# Patient Record
Sex: MALE | Race: WHITE | NOT HISPANIC OR LATINO | URBAN - METROPOLITAN AREA
[De-identification: names, ages, dates, MRNs, and addresses within clinical notes are randomized per-mention and may not be internally consistent; named-entity substitution may affect disease eponyms.]

---

## 2017-01-17 ENCOUNTER — OUTPATIENT (OUTPATIENT)
Dept: OUTPATIENT SERVICES | Facility: HOSPITAL | Age: 57
LOS: 1 days | End: 2017-01-17
Payer: COMMERCIAL

## 2017-01-17 ENCOUNTER — APPOINTMENT (OUTPATIENT)
Dept: CT IMAGING | Facility: HOSPITAL | Age: 57
End: 2017-01-17

## 2017-01-17 PROCEDURE — 74178 CT ABD&PLV WO CNTR FLWD CNTR: CPT

## 2017-02-13 ENCOUNTER — OTHER (OUTPATIENT)
Age: 57
End: 2017-02-13

## 2017-02-13 DIAGNOSIS — E66.9 OBESITY, UNSPECIFIED: ICD-10-CM

## 2017-02-14 ENCOUNTER — APPOINTMENT (OUTPATIENT)
Dept: FAMILY MEDICINE | Facility: CLINIC | Age: 57
End: 2017-02-14

## 2017-02-14 VITALS
OXYGEN SATURATION: 96 % | HEIGHT: 71 IN | DIASTOLIC BLOOD PRESSURE: 90 MMHG | SYSTOLIC BLOOD PRESSURE: 150 MMHG | RESPIRATION RATE: 14 BRPM | HEART RATE: 88 BPM | BODY MASS INDEX: 35.34 KG/M2 | WEIGHT: 252.44 LBS

## 2017-03-30 ENCOUNTER — APPOINTMENT (OUTPATIENT)
Dept: FAMILY MEDICINE | Facility: CLINIC | Age: 57
End: 2017-03-30

## 2017-03-30 VITALS
RESPIRATION RATE: 15 BRPM | OXYGEN SATURATION: 95 % | HEIGHT: 71 IN | HEART RATE: 93 BPM | DIASTOLIC BLOOD PRESSURE: 81 MMHG | SYSTOLIC BLOOD PRESSURE: 145 MMHG | WEIGHT: 252 LBS | BODY MASS INDEX: 35.28 KG/M2

## 2017-03-30 VITALS — SYSTOLIC BLOOD PRESSURE: 128 MMHG | DIASTOLIC BLOOD PRESSURE: 82 MMHG

## 2017-03-31 ENCOUNTER — OTHER (OUTPATIENT)
Age: 57
End: 2017-03-31

## 2017-06-30 ENCOUNTER — RX RENEWAL (OUTPATIENT)
Age: 57
End: 2017-06-30

## 2017-07-06 ENCOUNTER — APPOINTMENT (OUTPATIENT)
Dept: FAMILY MEDICINE | Facility: CLINIC | Age: 57
End: 2017-07-06

## 2017-09-26 ENCOUNTER — APPOINTMENT (OUTPATIENT)
Dept: FAMILY MEDICINE | Facility: CLINIC | Age: 57
End: 2017-09-26
Payer: COMMERCIAL

## 2017-09-26 ENCOUNTER — LABORATORY RESULT (OUTPATIENT)
Age: 57
End: 2017-09-26

## 2017-09-26 VITALS
SYSTOLIC BLOOD PRESSURE: 142 MMHG | HEIGHT: 71 IN | DIASTOLIC BLOOD PRESSURE: 80 MMHG | BODY MASS INDEX: 36.68 KG/M2 | WEIGHT: 262 LBS | HEART RATE: 92 BPM | RESPIRATION RATE: 16 BRPM | OXYGEN SATURATION: 95 %

## 2017-09-26 PROCEDURE — 99214 OFFICE O/P EST MOD 30 MIN: CPT

## 2017-11-24 ENCOUNTER — RX RENEWAL (OUTPATIENT)
Age: 57
End: 2017-11-24

## 2018-02-26 ENCOUNTER — APPOINTMENT (OUTPATIENT)
Dept: FAMILY MEDICINE | Facility: CLINIC | Age: 58
End: 2018-02-26

## 2018-02-27 ENCOUNTER — APPOINTMENT (OUTPATIENT)
Dept: FAMILY MEDICINE | Facility: CLINIC | Age: 58
End: 2018-02-27
Payer: COMMERCIAL

## 2018-02-27 VITALS
OXYGEN SATURATION: 95 % | HEART RATE: 91 BPM | HEIGHT: 71 IN | SYSTOLIC BLOOD PRESSURE: 140 MMHG | RESPIRATION RATE: 16 BRPM | DIASTOLIC BLOOD PRESSURE: 78 MMHG | BODY MASS INDEX: 36.43 KG/M2 | WEIGHT: 260.19 LBS

## 2018-02-27 DIAGNOSIS — R94.31 ABNORMAL ELECTROCARDIOGRAM [ECG] [EKG]: ICD-10-CM

## 2018-02-27 PROCEDURE — 99214 OFFICE O/P EST MOD 30 MIN: CPT

## 2018-03-01 ENCOUNTER — OTHER (OUTPATIENT)
Age: 58
End: 2018-03-01

## 2018-03-01 PROBLEM — R94.31 ABNORMAL EKG: Status: ACTIVE | Noted: 2018-03-01

## 2018-06-04 ENCOUNTER — APPOINTMENT (OUTPATIENT)
Dept: FAMILY MEDICINE | Facility: CLINIC | Age: 58
End: 2018-06-04
Payer: COMMERCIAL

## 2018-06-04 VITALS
OXYGEN SATURATION: 96 % | BODY MASS INDEX: 34.88 KG/M2 | SYSTOLIC BLOOD PRESSURE: 138 MMHG | WEIGHT: 249.13 LBS | DIASTOLIC BLOOD PRESSURE: 66 MMHG | HEART RATE: 82 BPM | HEIGHT: 71 IN | RESPIRATION RATE: 14 BRPM

## 2018-06-04 VITALS — SYSTOLIC BLOOD PRESSURE: 140 MMHG | DIASTOLIC BLOOD PRESSURE: 84 MMHG

## 2018-06-04 PROCEDURE — 99214 OFFICE O/P EST MOD 30 MIN: CPT

## 2018-06-05 NOTE — PHYSICAL EXAM
[No Acute Distress] : no acute distress [Well Nourished] : well nourished [Well Developed] : well developed [Well-Appearing] : well-appearing [Normal Sclera/Conjunctiva] : normal sclera/conjunctiva [PERRL] : pupils equal round and reactive to light [No JVD] : no jugular venous distention [Supple] : supple [No Lymphadenopathy] : no lymphadenopathy [Thyroid Normal, No Nodules] : the thyroid was normal and there were no nodules present [No Respiratory Distress] : no respiratory distress  [Clear to Auscultation] : lungs were clear to auscultation bilaterally [No Accessory Muscle Use] : no accessory muscle use [Normal Rate] : normal rate  [Regular Rhythm] : with a regular rhythm [Normal S1, S2] : normal S1 and S2 [No Murmur] : no murmur heard [Pedal Pulses Present] : the pedal pulses are present [No Edema] : there was no peripheral edema [Soft] : abdomen soft [Non Tender] : non-tender [Non-distended] : non-distended [No Masses] : no abdominal mass palpated [No HSM] : no HSM [Normal Bowel Sounds] : normal bowel sounds [Normal Posterior Cervical Nodes] : no posterior cervical lymphadenopathy [Normal Anterior Cervical Nodes] : no anterior cervical lymphadenopathy [No CVA Tenderness] : no CVA  tenderness [No Spinal Tenderness] : no spinal tenderness [No Joint Swelling] : no joint swelling [Grossly Normal Strength/Tone] : grossly normal strength/tone [No Rash] : no rash [Normal Gait] : normal gait [Coordination Grossly Intact] : coordination grossly intact [No Focal Deficits] : no focal deficits [Deep Tendon Reflexes (DTR)] : deep tendon reflexes were 2+ and symmetric [Normal Affect] : the affect was normal [Normal Insight/Judgement] : insight and judgment were intact

## 2018-06-05 NOTE — ASSESSMENT
[FreeTextEntry1] : diet, exercise\par discussed colonoscoy, patient states he will make appointment with gi this summer, name provided

## 2018-06-05 NOTE — HISTORY OF PRESENT ILLNESS
[FreeTextEntry1] : here for bp check [de-identified] : recently had complete blood work and physical at [facility]\par has copy of ekg with him, patient was advised to follow up with cardio re htn\par does not have copy of labs yet\par feels well with no complaints

## 2018-11-28 ENCOUNTER — APPOINTMENT (OUTPATIENT)
Dept: FAMILY MEDICINE | Facility: CLINIC | Age: 58
End: 2018-11-28
Payer: COMMERCIAL

## 2018-11-28 VITALS
BODY MASS INDEX: 35.87 KG/M2 | HEIGHT: 71 IN | OXYGEN SATURATION: 96 % | SYSTOLIC BLOOD PRESSURE: 114 MMHG | RESPIRATION RATE: 14 BRPM | DIASTOLIC BLOOD PRESSURE: 68 MMHG | WEIGHT: 256.19 LBS | HEART RATE: 89 BPM

## 2018-11-28 PROCEDURE — 99213 OFFICE O/P EST LOW 20 MIN: CPT

## 2018-11-28 NOTE — ASSESSMENT
[FreeTextEntry1] : Patient doing well his medications will be refilled. He is benign advised to followup with his PMD in several months or as needed

## 2018-11-28 NOTE — PHYSICAL EXAM
[No Acute Distress] : no acute distress [Well Nourished] : well nourished [Well Developed] : well developed [Well-Appearing] : well-appearing [Normal Sclera/Conjunctiva] : normal sclera/conjunctiva [PERRL] : pupils equal round and reactive to light [Normal Outer Ear/Nose] : the outer ears and nose were normal in appearance [Normal Oropharynx] : the oropharynx was normal [No JVD] : no jugular venous distention [Supple] : supple [No Lymphadenopathy] : no lymphadenopathy [Clear to Auscultation] : lungs were clear to auscultation bilaterally [No Accessory Muscle Use] : no accessory muscle use [Normal Rate] : normal rate  [Regular Rhythm] : with a regular rhythm [Normal S1, S2] : normal S1 and S2 [No Murmur] : no murmur heard [No Edema] : there was no peripheral edema [Soft] : abdomen soft [No Masses] : no abdominal mass palpated [No HSM] : no HSM [Normal Bowel Sounds] : normal bowel sounds [Normal Supraclavicular Nodes] : no supraclavicular lymphadenopathy [Normal Anterior Cervical Nodes] : no anterior cervical lymphadenopathy [No CVA Tenderness] : no CVA  tenderness [No Spinal Tenderness] : no spinal tenderness [No Joint Swelling] : no joint swelling [Normal Gait] : normal gait [Coordination Grossly Intact] : coordination grossly intact [No Focal Deficits] : no focal deficits [de-identified] : Mild obesity

## 2018-11-28 NOTE — HISTORY OF PRESENT ILLNESS
[FreeTextEntry1] : hypertension [de-identified] : The patient is here for followup on his hypertension his prescriptions are getting low on he feels well review of systems is unremarkable he is up-to-date with flu shot.

## 2019-01-24 ENCOUNTER — APPOINTMENT (OUTPATIENT)
Dept: GASTROENTEROLOGY | Facility: CLINIC | Age: 59
End: 2019-01-24
Payer: COMMERCIAL

## 2019-01-24 VITALS
DIASTOLIC BLOOD PRESSURE: 96 MMHG | HEIGHT: 71 IN | TEMPERATURE: 98.1 F | WEIGHT: 250 LBS | SYSTOLIC BLOOD PRESSURE: 158 MMHG | BODY MASS INDEX: 35 KG/M2 | OXYGEN SATURATION: 98 % | RESPIRATION RATE: 16 BRPM | HEART RATE: 87 BPM

## 2019-01-24 DIAGNOSIS — Z12.11 ENCOUNTER FOR SCREENING FOR MALIGNANT NEOPLASM OF COLON: ICD-10-CM

## 2019-01-24 PROCEDURE — 99204 OFFICE O/P NEW MOD 45 MIN: CPT

## 2019-01-24 NOTE — HISTORY OF PRESENT ILLNESS
[de-identified] : 58-year-old male presents for evaluation prior to screening colonoscopy. Regular bowel habit without any bleeding. No family history of colon cancer. No prior history of colonoscopy. Denies any reflux complaints, no difficulty swallowing.\par \par Denies any history of coronary artery disease, congestive heart failure or dysrhythmia\par \par Social history: Nonsmoker,  [place]

## 2019-01-24 NOTE — ASSESSMENT
[FreeTextEntry1] : 58-year-old male average risk for colon cancer and polyps\par \par Plan\par Patient educated regarding the indications risks benefits and alternatives to colonoscopy as a tool for colon cancer screening and prevention. He is agreeable to examination.\par \par Patient referred by Dr. Roxanne Martin

## 2019-01-24 NOTE — PHYSICAL EXAM
[General Appearance - Alert] : alert [General Appearance - In No Acute Distress] : in no acute distress [Sclera] : the sclera and conjunctiva were normal [PERRL With Normal Accommodation] : pupils were equal in size, round, and reactive to light [Extraocular Movements] : extraocular movements were intact [Outer Ear] : the ears and nose were normal in appearance [Oropharynx] : the oropharynx was normal [Neck Appearance] : the appearance of the neck was normal [Neck Cervical Mass (___cm)] : no neck mass was observed [Jugular Venous Distention Increased] : there was no jugular-venous distention [Thyroid Diffuse Enlargement] : the thyroid was not enlarged [Thyroid Nodule] : there were no palpable thyroid nodules [Auscultation Breath Sounds / Voice Sounds] : lungs were clear to auscultation bilaterally [Heart Rate And Rhythm] : heart rate was normal and rhythm regular [Heart Sounds] : normal S1 and S2 [Heart Sounds Gallop] : no gallops [Murmurs] : no murmurs [Heart Sounds Pericardial Friction Rub] : no pericardial rub [Edema] : there was no peripheral edema [Bowel Sounds] : normal bowel sounds [Abdomen Soft] : soft [Abdomen Tenderness] : non-tender [Abdomen Mass (___ Cm)] : no abdominal mass palpated [Cervical Lymph Nodes Enlarged Posterior Bilaterally] : posterior cervical [Cervical Lymph Nodes Enlarged Anterior Bilaterally] : anterior cervical [Supraclavicular Lymph Nodes Enlarged Bilaterally] : supraclavicular [Axillary Lymph Nodes Enlarged Bilaterally] : axillary [Femoral Lymph Nodes Enlarged Bilaterally] : femoral [Inguinal Lymph Nodes Enlarged Bilaterally] : inguinal [No CVA Tenderness] : no ~M costovertebral angle tenderness [No Spinal Tenderness] : no spinal tenderness [Abnormal Walk] : normal gait [Nail Clubbing] : no clubbing  or cyanosis of the fingernails [Musculoskeletal - Swelling] : no joint swelling seen [Motor Tone] : muscle strength and tone were normal [Skin Color & Pigmentation] : normal skin color and pigmentation [Skin Turgor] : normal skin turgor [] : no rash [Oriented To Time, Place, And Person] : oriented to person, place, and time [Impaired Insight] : insight and judgment were intact [Affect] : the affect was normal [FreeTextEntry1] : Small umbilical hernia

## 2019-05-08 ENCOUNTER — APPOINTMENT (OUTPATIENT)
Dept: GASTROENTEROLOGY | Facility: AMBULATORY MEDICAL SERVICES | Age: 59
End: 2019-05-08
Payer: COMMERCIAL

## 2019-05-08 PROCEDURE — 45385 COLONOSCOPY W/LESION REMOVAL: CPT

## 2019-05-28 ENCOUNTER — APPOINTMENT (OUTPATIENT)
Dept: FAMILY MEDICINE | Facility: CLINIC | Age: 59
End: 2019-05-28
Payer: COMMERCIAL

## 2019-05-28 VITALS
SYSTOLIC BLOOD PRESSURE: 144 MMHG | BODY MASS INDEX: 34.45 KG/M2 | HEART RATE: 70 BPM | DIASTOLIC BLOOD PRESSURE: 88 MMHG | HEIGHT: 71 IN | OXYGEN SATURATION: 97 % | WEIGHT: 246.06 LBS | RESPIRATION RATE: 14 BRPM

## 2019-05-28 PROCEDURE — 99214 OFFICE O/P EST MOD 30 MIN: CPT

## 2019-05-28 RX ORDER — SODIUM SULFATE, POTASSIUM SULFATE, MAGNESIUM SULFATE 17.5; 3.13; 1.6 G/ML; G/ML; G/ML
17.5-3.13-1.6 SOLUTION, CONCENTRATE ORAL
Qty: 1 | Refills: 0 | Status: COMPLETED | COMMUNITY
Start: 2019-01-24 | End: 2019-05-28

## 2019-05-28 NOTE — PHYSICAL EXAM
[No Acute Distress] : no acute distress [Well Nourished] : well nourished [Well Developed] : well developed [Normal Sclera/Conjunctiva] : normal sclera/conjunctiva [PERRL] : pupils equal round and reactive to light [EOMI] : extraocular movements intact [Normal Outer Ear/Nose] : the outer ears and nose were normal in appearance [Normal Oropharynx] : the oropharynx was normal [No Respiratory Distress] : no respiratory distress  [Clear to Auscultation] : lungs were clear to auscultation bilaterally [Normal Rate] : normal rate  [No Edema] : there was no peripheral edema

## 2019-05-28 NOTE — HISTORY OF PRESENT ILLNESS
[FreeTextEntry1] : here for bp check [de-identified] : feels fine overall\par recently had cpe at [facility] and has blood work, ekg, pft with him \par states she checks bp at home with cuff 130's over 80's\par today repeat bp is 142/88\par

## 2019-08-20 ENCOUNTER — APPOINTMENT (OUTPATIENT)
Dept: FAMILY MEDICINE | Facility: CLINIC | Age: 59
End: 2019-08-20
Payer: COMMERCIAL

## 2019-08-20 VITALS
HEART RATE: 75 BPM | HEIGHT: 71 IN | OXYGEN SATURATION: 96 % | DIASTOLIC BLOOD PRESSURE: 72 MMHG | WEIGHT: 231.25 LBS | SYSTOLIC BLOOD PRESSURE: 110 MMHG | RESPIRATION RATE: 14 BRPM | BODY MASS INDEX: 32.37 KG/M2

## 2019-08-20 PROCEDURE — 99214 OFFICE O/P EST MOD 30 MIN: CPT

## 2019-08-20 NOTE — HISTORY OF PRESENT ILLNESS
[FreeTextEntry1] : here for follow up [de-identified] : overall feels well with no complaints bp has been well controlled\par , has cpe and blood work yearly\par blood work from may 2019 reviewed all wnl

## 2019-09-27 ENCOUNTER — EMERGENCY (EMERGENCY)
Facility: HOSPITAL | Age: 59
LOS: 1 days | Discharge: HOME | End: 2019-09-27
Attending: EMERGENCY MEDICINE | Admitting: HOSPITALIST
Payer: COMMERCIAL

## 2019-09-27 VITALS
HEART RATE: 102 BPM | OXYGEN SATURATION: 97 % | DIASTOLIC BLOOD PRESSURE: 90 MMHG | HEIGHT: 71 IN | SYSTOLIC BLOOD PRESSURE: 151 MMHG | TEMPERATURE: 98 F | WEIGHT: 229.94 LBS | RESPIRATION RATE: 18 BRPM

## 2019-09-27 LAB
ANION GAP SERPL CALC-SCNC: 8 MMOL/L — SIGNIFICANT CHANGE UP (ref 5–17)
BASOPHILS # BLD AUTO: 0.07 K/UL — SIGNIFICANT CHANGE UP (ref 0–0.2)
BASOPHILS NFR BLD AUTO: 0.6 % — SIGNIFICANT CHANGE UP (ref 0–2)
BUN SERPL-MCNC: 14 MG/DL — SIGNIFICANT CHANGE UP (ref 7–23)
CALCIUM SERPL-MCNC: 9.6 MG/DL — SIGNIFICANT CHANGE UP (ref 8.4–10.5)
CHLORIDE SERPL-SCNC: 100 MMOL/L — SIGNIFICANT CHANGE UP (ref 96–108)
CO2 SERPL-SCNC: 29 MMOL/L — SIGNIFICANT CHANGE UP (ref 22–31)
CREAT SERPL-MCNC: 1.09 MG/DL — SIGNIFICANT CHANGE UP (ref 0.5–1.3)
EOSINOPHIL # BLD AUTO: 0.05 K/UL — SIGNIFICANT CHANGE UP (ref 0–0.5)
EOSINOPHIL NFR BLD AUTO: 0.5 % — SIGNIFICANT CHANGE UP (ref 0–6)
GLUCOSE SERPL-MCNC: 110 MG/DL — HIGH (ref 70–99)
HCT VFR BLD CALC: 45.1 % — SIGNIFICANT CHANGE UP (ref 39–50)
HGB BLD-MCNC: 16 G/DL — SIGNIFICANT CHANGE UP (ref 13–17)
IMM GRANULOCYTES NFR BLD AUTO: 0.4 % — SIGNIFICANT CHANGE UP (ref 0–1.5)
LYMPHOCYTES # BLD AUTO: 1.56 K/UL — SIGNIFICANT CHANGE UP (ref 1–3.3)
LYMPHOCYTES # BLD AUTO: 14.5 % — SIGNIFICANT CHANGE UP (ref 13–44)
MAGNESIUM SERPL-MCNC: 1.5 MG/DL — LOW (ref 1.6–2.6)
MCHC RBC-ENTMCNC: 33.2 PG — SIGNIFICANT CHANGE UP (ref 27–34)
MCHC RBC-ENTMCNC: 35.5 GM/DL — SIGNIFICANT CHANGE UP (ref 32–36)
MCV RBC AUTO: 93.6 FL — SIGNIFICANT CHANGE UP (ref 80–100)
MONOCYTES # BLD AUTO: 1.04 K/UL — HIGH (ref 0–0.9)
MONOCYTES NFR BLD AUTO: 9.6 % — SIGNIFICANT CHANGE UP (ref 2–14)
NEUTROPHILS # BLD AUTO: 8.03 K/UL — HIGH (ref 1.8–7.4)
NEUTROPHILS NFR BLD AUTO: 74.4 % — SIGNIFICANT CHANGE UP (ref 43–77)
NRBC # BLD: 0 /100 WBCS — SIGNIFICANT CHANGE UP (ref 0–0)
PLATELET # BLD AUTO: 169 K/UL — SIGNIFICANT CHANGE UP (ref 150–400)
POTASSIUM SERPL-MCNC: 3.8 MMOL/L — SIGNIFICANT CHANGE UP (ref 3.5–5.3)
POTASSIUM SERPL-SCNC: 3.8 MMOL/L — SIGNIFICANT CHANGE UP (ref 3.5–5.3)
RBC # BLD: 4.82 M/UL — SIGNIFICANT CHANGE UP (ref 4.2–5.8)
RBC # FLD: 12.2 % — SIGNIFICANT CHANGE UP (ref 10.3–14.5)
SODIUM SERPL-SCNC: 137 MMOL/L — SIGNIFICANT CHANGE UP (ref 135–145)
TROPONIN I SERPL-MCNC: <.017 NG/ML — LOW (ref 0.02–0.06)
WBC # BLD: 10.79 K/UL — HIGH (ref 3.8–10.5)
WBC # FLD AUTO: 10.79 K/UL — HIGH (ref 3.8–10.5)

## 2019-09-27 PROCEDURE — 99285 EMERGENCY DEPT VISIT HI MDM: CPT

## 2019-09-27 PROCEDURE — 99220: CPT

## 2019-09-27 PROCEDURE — 71045 X-RAY EXAM CHEST 1 VIEW: CPT | Mod: 26

## 2019-09-27 PROCEDURE — 74174 CTA ABD&PLVS W/CONTRAST: CPT | Mod: 26

## 2019-09-27 PROCEDURE — 71275 CT ANGIOGRAPHY CHEST: CPT | Mod: 26

## 2019-09-27 PROCEDURE — 93010 ELECTROCARDIOGRAM REPORT: CPT

## 2019-09-27 RX ORDER — HYDROCHLOROTHIAZIDE 25 MG
25 TABLET ORAL DAILY
Refills: 0 | Status: DISCONTINUED | OUTPATIENT
Start: 2019-09-27 | End: 2019-10-04

## 2019-09-27 RX ORDER — ASPIRIN/CALCIUM CARB/MAGNESIUM 324 MG
81 TABLET ORAL DAILY
Refills: 0 | Status: DISCONTINUED | OUTPATIENT
Start: 2019-09-27 | End: 2019-10-04

## 2019-09-27 RX ORDER — CANDESARTAN CILEXETIL 8 MG/1
1 TABLET ORAL
Qty: 0 | Refills: 0 | DISCHARGE

## 2019-09-27 RX ORDER — ASPIRIN/CALCIUM CARB/MAGNESIUM 324 MG
81 TABLET ORAL ONCE
Refills: 0 | Status: COMPLETED | OUTPATIENT
Start: 2019-09-27 | End: 2019-09-27

## 2019-09-27 RX ORDER — PANTOPRAZOLE SODIUM 20 MG/1
40 TABLET, DELAYED RELEASE ORAL
Refills: 0 | Status: DISCONTINUED | OUTPATIENT
Start: 2019-09-27 | End: 2019-10-04

## 2019-09-27 RX ORDER — NITROGLYCERIN 6.5 MG
1 CAPSULE, EXTENDED RELEASE ORAL EVERY 6 HOURS
Refills: 0 | Status: DISCONTINUED | OUTPATIENT
Start: 2019-09-27 | End: 2019-10-04

## 2019-09-27 RX ORDER — PANTOPRAZOLE SODIUM 20 MG/1
40 TABLET, DELAYED RELEASE ORAL ONCE
Refills: 0 | Status: COMPLETED | OUTPATIENT
Start: 2019-09-27 | End: 2019-09-27

## 2019-09-27 RX ORDER — LOSARTAN POTASSIUM 100 MG/1
50 TABLET, FILM COATED ORAL DAILY
Refills: 0 | Status: DISCONTINUED | OUTPATIENT
Start: 2019-09-27 | End: 2019-10-04

## 2019-09-27 RX ORDER — BENZOCAINE AND MENTHOL 5; 1 G/100ML; G/100ML
1 LIQUID ORAL
Refills: 0 | Status: DISCONTINUED | OUTPATIENT
Start: 2019-09-27 | End: 2019-10-04

## 2019-09-27 RX ORDER — MAGNESIUM SULFATE 500 MG/ML
1 VIAL (ML) INJECTION ONCE
Refills: 0 | Status: COMPLETED | OUTPATIENT
Start: 2019-09-27 | End: 2019-09-27

## 2019-09-27 RX ADMIN — PANTOPRAZOLE SODIUM 40 MILLIGRAM(S): 20 TABLET, DELAYED RELEASE ORAL at 19:29

## 2019-09-27 RX ADMIN — Medication 100 GRAM(S): at 19:29

## 2019-09-27 RX ADMIN — Medication 81 MILLIGRAM(S): at 15:25

## 2019-09-27 NOTE — H&P ADULT - ASSESSMENT
58 yo M with pmhx of HTN presents for CP that started 2am the day of presentation.       # atypical chest pain   - r/o ACS  - ASA 81mg   - Nitro ointment prn  - follow up cardiology  - trend troponin  - 2d ECHO  - lipid panel, hba1c    # subjective fever, sore throat/ cough - likely viral illness  - supportive care  - lozenges prn    # HTN  - c/w HCTZ and losartan    # incidental finding of possible mild diverticulitis  - pt is asymptomatic, NO abd pain, n/v/d  - monitor     # DVT ppx: 58 yo M with pmhx of HTN presents for CP that started 2am the day of presentation.       # atypical chest pain   - r/o ACS r/o GERD  - ASA 81mg   - trial of protonix 40mg qD  - Nitro ointment prn  - follow up cardiology  - trend troponin  - 2d ECHO  - lipid panel, hba1c    # subjective fever, sore throat/ cough - likely viral illness  - supportive care  - lozenges prn    # HTN  - c/w HCTZ and losartan    # incidental finding of possible mild diverticulitis  - pt is asymptomatic, NO abd pain, n/v/d  - monitor     # DVT ppx: ambulate    IMPROVE VTE Individual Risk Assessment    RISK                                                                Points    [  ] Previous VTE                                                  3    [  ] Thrombophilia                                               2    [  ] Lower limb paralysis                                      2        (unable to hold up >15 seconds)      [  ] Current Cancer                                              2         (within 6 months)    [  ] Immobilization > 24 hrs                                1    [  ] ICU/CCU stay > 24 hours                              1    [  ] Age > 60                                                      1    IMPROVE VTE Score _____0____    IMPROVE Score 0-1: Low Risk, No VTE prophylaxis required for most patients, encourage ambulation.   IMPROVE Score 2-3: At risk, pharmacologic VTE prophylaxis is indicated for most patients (in the absence of a contraindication)  IMPROVE Score > or = 4: High Risk, pharmacologic VTE prophylaxis is indicated for most patients (in the absence of a contraindication) 58 yo M with pmhx of HTN presents for CP that started 2am the day of presentation.       # atypical chest pain   - r/o ACS r/o GERD  - ASA 81mg   - trial of protonix 40mg qD  - Nitro ointment prn  - follow up cardiology  - trend troponin  - 2d ECHO  - lipid panel, hba1c    # subjective fever, sore throat/ cough - likely viral illness  - supportive care  - lozenges prn    # HTN  - c/w HCTZ and losartan    # incidental finding of possible mild diverticulitis  - pt is asymptomatic, NO abd pain, n/v/d  - monitor     # Hypomagnesium  - repleted    # DVT ppx: ambulate    IMPROVE VTE Individual Risk Assessment    RISK                                                                Points    [  ] Previous VTE                                                  3    [  ] Thrombophilia                                               2    [  ] Lower limb paralysis                                      2        (unable to hold up >15 seconds)      [  ] Current Cancer                                              2         (within 6 months)    [  ] Immobilization > 24 hrs                                1    [  ] ICU/CCU stay > 24 hours                              1    [  ] Age > 60                                                      1    IMPROVE VTE Score _____0____    IMPROVE Score 0-1: Low Risk, No VTE prophylaxis required for most patients, encourage ambulation.   IMPROVE Score 2-3: At risk, pharmacologic VTE prophylaxis is indicated for most patients (in the absence of a contraindication)  IMPROVE Score > or = 4: High Risk, pharmacologic VTE prophylaxis is indicated for most patients (in the absence of a contraindication) 60 yo M with pmhx of HTN presents for CP that started 2am the day of presentation.       # atypical chest pain   - r/o ACS r/o GERD  - Admit to CDU  - ASA 81mg   - trial of protonix 40mg qD  - Nitro ointment prn  - follow up cardiology  - trend troponin  - 2d ECHO  - lipid panel, hba1c    # subjective fever, sore throat/ cough - likely viral illness  - supportive care  - lozenges prn    # HTN  - c/w HCTZ and losartan    # incidental finding of possible mild diverticulitis  - pt is asymptomatic, NO abd pain, n/v/d  - monitor     # Hypomagnesium  - repleted    # DVT ppx: ambulate    IMPROVE VTE Individual Risk Assessment    RISK                                                                Points    [  ] Previous VTE                                                  3    [  ] Thrombophilia                                               2    [  ] Lower limb paralysis                                      2        (unable to hold up >15 seconds)      [  ] Current Cancer                                              2         (within 6 months)    [  ] Immobilization > 24 hrs                                1    [  ] ICU/CCU stay > 24 hours                              1    [  ] Age > 60                                                      1    IMPROVE VTE Score _____0____    IMPROVE Score 0-1: Low Risk, No VTE prophylaxis required for most patients, encourage ambulation.   IMPROVE Score 2-3: At risk, pharmacologic VTE prophylaxis is indicated for most patients (in the absence of a contraindication)  IMPROVE Score > or = 4: High Risk, pharmacologic VTE prophylaxis is indicated for most patients (in the absence of a contraindication)

## 2019-09-27 NOTE — ED ADULT NURSE NOTE - CHPI ED NUR SYMPTOMS NEG
no syncope/no diaphoresis/no congestion/no shortness of breath/no vomiting/no back pain/no dizziness/no chills/no fever/no nausea

## 2019-09-27 NOTE — ED ADULT NURSE NOTE - OBJECTIVE STATEMENT
pt AO x3, ambulatory, c/o chest pain (pressure) radiating to his neck area since early this morning around 2 AM. PMH of HTN. Denies FMH of heart. Jayson dizziness, SOB or n/v at this time. pt AO x3, ambulatory, c/o chest pain (pressure) radiating to his neck area since early this morning around 2 AM. PMH of HTN. Denies FMH of heart. Jayson dizziness, SOB or n/v at this time. EKG done/sinus and reviewed by MD Colletta. Awaiting further study.

## 2019-09-27 NOTE — H&P ADULT - HISTORY OF PRESENT ILLNESS
58 yo M with pmhx of HTN presents for CP that started 2am the day of presentation. Pt reported substernal CP around 2 am that woke him up, pain radiates to the throat, described as pressure like pain, constant, intensity varies between 4 to 7/10. Pain is worse with bending down and coughing and gets better in a reclining position/sitting up. Pt reports subjective fever and sore throat with slight cough for one day. Pt denies SOB, palpitations, abd pain ,n/v/d, dysuria, or leg swelling.   Pt also reports he had a negative exercise nuclear stress test two years ago. 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    In ED: afebrile,  /90 saturating 97% on RA.  CTA chest: negative  CXR; negative 58 yo M with pmhx of HTN presents for CP that started 2am the day of presentation. Pt reported substernal CP around 2 am that woke him up, pain radiates to the throat, described as pressure like pain, constant, intensity varies between 4 to 7/10. Pain is worse with bending down and coughing and gets better in a reclining position/sitting up. Pt also states that he had three slices of pizza right before sleeping and chest pain felt like acid reflux at times.  Pt reports subjective fever and sore throat with slight cough for one day. Pt denies SOB, palpitations, abd pain ,n/v/d, dysuria, or leg swelling.   Pt also reports he had a negative exercise nuclear stress test two years ago. 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    In ED: afebrile,  /90 saturating 97% on RA.  CTA chest: negative  CXR; negative  EKG: negative ST/T changes

## 2019-09-27 NOTE — H&P ADULT - NSHPPHYSICALEXAM_GEN_ALL_CORE
GENERAL: NAD  HEAD:  Atraumatic, Normocephalic  EYES: EOMI, PERRLA, sclera clear  Throat: could not visualize throat completely but + erythematous upper pharynx   NECK: Supple  CHEST/LUNG: Clear to auscultation bilaterally; No wheeze  HEART: Regular rate and rhythm; No murmurs  ABDOMEN: Soft, Nontender, Nondistended; Bowel sounds present  EXTREMITIES:  no edema  PSYCH: AAOx3  NEUROLOGY: non-focal  SKIN: No rashes or lesions GENERAL: NAD  HEAD:  Atraumatic, Normocephalic  EYES: EOMI, PERRLA, sclera clear  Throat: could not visualize throat completely but + erythematous upper pharynx   NECK: Supple  CHEST/LUNG: Clear to auscultation bilaterally; No wheeze  HEART: Regular rate and rhythm; No murmurs. no reproducible CP  ABDOMEN: Soft, Nontender, Nondistended; Bowel sounds present  EXTREMITIES:  no edema  PSYCH: AAOx3  NEUROLOGY: non-focal  SKIN: No rashes or lesions

## 2019-09-27 NOTE — H&P ADULT - NSHPLABSRESULTS_GEN_ALL_CORE
T(C): 37.8 (09-27-19 @ 18:41), Max: 37.8 (09-27-19 @ 18:41)  T(F): 100 (09-27-19 @ 18:41), Max: 100 (09-27-19 @ 18:41)  HR: 110 (09-27-19 @ 18:41) (100 - 110)  BP: 148/87 (09-27-19 @ 18:41) (148/87 - 154/91)  RR: 18 (09-27-19 @ 18:41) (18 - 18)  SpO2: 96% (09-27-19 @ 18:41) (96% - 97%)  Labs:                                  16.0   10.79<H>   )-----------(   169      ( 27 Sep 2019 15:30 )                        45.1     Neutro%  74.4    Lympho%  14.5    Mono%    9.6     Bands    x        09-27    137  |  100  |  14  ----------------------------<  110<H>  3.8   |  29  |  1.09    Ca    9.6      27 Sep 2019 15:30  Mg     1.5     09-27      eGFR if Non African American: 75 mL/min/1.73M2 (09-27-19 @ 15:30)  eGFR if : 86 mL/min/1.73M2 (09-27-19 @ 15:30)      CARDIAC MARKERS ( 27 Sep 2019 15:30 )  <.017 ng/mL / x     / x     / x     / x

## 2019-09-28 ENCOUNTER — TRANSCRIPTION ENCOUNTER (OUTPATIENT)
Age: 59
End: 2019-09-28

## 2019-09-28 VITALS
RESPIRATION RATE: 16 BRPM | SYSTOLIC BLOOD PRESSURE: 138 MMHG | DIASTOLIC BLOOD PRESSURE: 80 MMHG | OXYGEN SATURATION: 100 % | HEART RATE: 96 BPM | TEMPERATURE: 98 F

## 2019-09-28 LAB
ANION GAP SERPL CALC-SCNC: 11 MMOL/L — SIGNIFICANT CHANGE UP (ref 5–17)
BASOPHILS # BLD AUTO: 0.05 K/UL — SIGNIFICANT CHANGE UP (ref 0–0.2)
BASOPHILS NFR BLD AUTO: 0.5 % — SIGNIFICANT CHANGE UP (ref 0–2)
BUN SERPL-MCNC: 12 MG/DL — SIGNIFICANT CHANGE UP (ref 7–23)
CALCIUM SERPL-MCNC: 9.6 MG/DL — SIGNIFICANT CHANGE UP (ref 8.4–10.5)
CHLORIDE SERPL-SCNC: 100 MMOL/L — SIGNIFICANT CHANGE UP (ref 96–108)
CHOLEST SERPL-MCNC: 140 MG/DL — SIGNIFICANT CHANGE UP (ref 10–199)
CO2 SERPL-SCNC: 26 MMOL/L — SIGNIFICANT CHANGE UP (ref 22–31)
CREAT SERPL-MCNC: 1.08 MG/DL — SIGNIFICANT CHANGE UP (ref 0.5–1.3)
EOSINOPHIL # BLD AUTO: 0.05 K/UL — SIGNIFICANT CHANGE UP (ref 0–0.5)
EOSINOPHIL NFR BLD AUTO: 0.5 % — SIGNIFICANT CHANGE UP (ref 0–6)
GLUCOSE SERPL-MCNC: 123 MG/DL — HIGH (ref 70–99)
HBA1C BLD-MCNC: 5.6 % — SIGNIFICANT CHANGE UP (ref 4–5.6)
HCT VFR BLD CALC: 42.9 % — SIGNIFICANT CHANGE UP (ref 39–50)
HCV AB S/CO SERPL IA: 0.06 S/CO — SIGNIFICANT CHANGE UP (ref 0–0.99)
HCV AB SERPL-IMP: SIGNIFICANT CHANGE UP
HDLC SERPL-MCNC: 50 MG/DL — SIGNIFICANT CHANGE UP
HGB BLD-MCNC: 15.1 G/DL — SIGNIFICANT CHANGE UP (ref 13–17)
IMM GRANULOCYTES NFR BLD AUTO: 0.3 % — SIGNIFICANT CHANGE UP (ref 0–1.5)
LIPID PNL WITH DIRECT LDL SERPL: 74 MG/DL — SIGNIFICANT CHANGE UP
LYMPHOCYTES # BLD AUTO: 1.36 K/UL — SIGNIFICANT CHANGE UP (ref 1–3.3)
LYMPHOCYTES # BLD AUTO: 14.8 % — SIGNIFICANT CHANGE UP (ref 13–44)
MAGNESIUM SERPL-MCNC: 2.1 MG/DL — SIGNIFICANT CHANGE UP (ref 1.6–2.6)
MCHC RBC-ENTMCNC: 32.1 PG — SIGNIFICANT CHANGE UP (ref 27–34)
MCHC RBC-ENTMCNC: 35.2 GM/DL — SIGNIFICANT CHANGE UP (ref 32–36)
MCV RBC AUTO: 91.1 FL — SIGNIFICANT CHANGE UP (ref 80–100)
MONOCYTES # BLD AUTO: 0.95 K/UL — HIGH (ref 0–0.9)
MONOCYTES NFR BLD AUTO: 10.3 % — SIGNIFICANT CHANGE UP (ref 2–14)
NEUTROPHILS # BLD AUTO: 6.76 K/UL — SIGNIFICANT CHANGE UP (ref 1.8–7.4)
NEUTROPHILS NFR BLD AUTO: 73.6 % — SIGNIFICANT CHANGE UP (ref 43–77)
NRBC # BLD: 0 /100 WBCS — SIGNIFICANT CHANGE UP (ref 0–0)
PLATELET # BLD AUTO: 182 K/UL — SIGNIFICANT CHANGE UP (ref 150–400)
POTASSIUM SERPL-MCNC: 3.3 MMOL/L — LOW (ref 3.5–5.3)
POTASSIUM SERPL-SCNC: 3.3 MMOL/L — LOW (ref 3.5–5.3)
RBC # BLD: 4.71 M/UL — SIGNIFICANT CHANGE UP (ref 4.2–5.8)
RBC # FLD: 12.4 % — SIGNIFICANT CHANGE UP (ref 10.3–14.5)
SODIUM SERPL-SCNC: 137 MMOL/L — SIGNIFICANT CHANGE UP (ref 135–145)
TOTAL CHOLESTEROL/HDL RATIO MEASUREMENT: 2.8 RATIO — LOW (ref 3.4–9.6)
TRIGL SERPL-MCNC: 80 MG/DL — SIGNIFICANT CHANGE UP (ref 10–149)
WBC # BLD: 9.2 K/UL — SIGNIFICANT CHANGE UP (ref 3.8–10.5)
WBC # FLD AUTO: 9.2 K/UL — SIGNIFICANT CHANGE UP (ref 3.8–10.5)

## 2019-09-28 PROCEDURE — 86803 HEPATITIS C AB TEST: CPT

## 2019-09-28 PROCEDURE — 80061 LIPID PANEL: CPT

## 2019-09-28 PROCEDURE — 99284 EMERGENCY DEPT VISIT MOD MDM: CPT | Mod: 25

## 2019-09-28 PROCEDURE — 93306 TTE W/DOPPLER COMPLETE: CPT

## 2019-09-28 PROCEDURE — 85027 COMPLETE CBC AUTOMATED: CPT

## 2019-09-28 PROCEDURE — 96374 THER/PROPH/DIAG INJ IV PUSH: CPT | Mod: XU

## 2019-09-28 PROCEDURE — G0378: CPT

## 2019-09-28 PROCEDURE — 84484 ASSAY OF TROPONIN QUANT: CPT

## 2019-09-28 PROCEDURE — 99217: CPT

## 2019-09-28 PROCEDURE — 36415 COLL VENOUS BLD VENIPUNCTURE: CPT

## 2019-09-28 PROCEDURE — 80048 BASIC METABOLIC PNL TOTAL CA: CPT

## 2019-09-28 PROCEDURE — 83036 HEMOGLOBIN GLYCOSYLATED A1C: CPT

## 2019-09-28 PROCEDURE — 99220: CPT

## 2019-09-28 PROCEDURE — 71275 CT ANGIOGRAPHY CHEST: CPT

## 2019-09-28 PROCEDURE — 74174 CTA ABD&PLVS W/CONTRAST: CPT

## 2019-09-28 PROCEDURE — 93005 ELECTROCARDIOGRAM TRACING: CPT

## 2019-09-28 PROCEDURE — 71045 X-RAY EXAM CHEST 1 VIEW: CPT

## 2019-09-28 PROCEDURE — 83735 ASSAY OF MAGNESIUM: CPT

## 2019-09-28 RX ORDER — PANTOPRAZOLE SODIUM 20 MG/1
1 TABLET, DELAYED RELEASE ORAL
Qty: 15 | Refills: 0
Start: 2019-09-28 | End: 2019-10-12

## 2019-09-28 RX ORDER — POTASSIUM CHLORIDE 20 MEQ
40 PACKET (EA) ORAL EVERY 4 HOURS
Refills: 0 | Status: COMPLETED | OUTPATIENT
Start: 2019-09-28 | End: 2019-09-28

## 2019-09-28 RX ADMIN — BENZOCAINE AND MENTHOL 1 LOZENGE: 5; 1 LIQUID ORAL at 11:01

## 2019-09-28 RX ADMIN — Medication 81 MILLIGRAM(S): at 11:00

## 2019-09-28 RX ADMIN — PANTOPRAZOLE SODIUM 40 MILLIGRAM(S): 20 TABLET, DELAYED RELEASE ORAL at 05:26

## 2019-09-28 RX ADMIN — Medication 40 MILLIEQUIVALENT(S): at 10:32

## 2019-09-28 RX ADMIN — Medication 40 MILLIEQUIVALENT(S): at 13:36

## 2019-09-28 RX ADMIN — Medication 25 MILLIGRAM(S): at 05:26

## 2019-09-28 RX ADMIN — LOSARTAN POTASSIUM 50 MILLIGRAM(S): 100 TABLET, FILM COATED ORAL at 05:26

## 2019-09-28 NOTE — PROGRESS NOTE ADULT - ASSESSMENT
58 yo M with PMHx of HTN presents with chest pain, now resolved    #Atypical chest pain: likely acid reflux,  trops negative x 3, CTA chest/ abdomen negative for dissection. ECHO without regional wall abnormalities. Normal lipid panel,   - continue protonix  - evaluated by [facility] (Dr. Dye) ok to have stress test outpt  - medically stable for dc, will follow up with [facility] after discharge    #HTN:- continue home losartan and HCTZ    #Hypokalemia: repleted    #DVT ppx:   - ambulate    CAPRINI SCORE [CLOT]    AGE RELATED RISK FACTORS                                                       MOBILITY RELATED FACTORS  [x ] Age 41-60 years                                            (1 Point)                  [ ] Bed rest                                                        (1 Point)  [ ] Age: 61-74 years                                           (2 Points)                 [ ] Plaster cast                                                   (2 Points)  [ ] Age= 75 years                                              (3 Points)                 [ ] Bed bound for more than 72 hours                 (2 Points)    DISEASE RELATED RISK FACTORS                                               GENDER SPECIFIC FACTORS  [ ] Edema in the lower extremities                       (1 Point)                  [ ] Pregnancy                                                     (1 Point)  [ ] Varicose veins                                               (1 Point)                  [ ] Post-partum < 6 weeks                                   (1 Point)             [ ] BMI > 25 Kg/m2                                            (1 Point)                  [ ] Hormonal therapy  or oral contraception          (1 Point)                 [ ] Sepsis (in the previous month)                        (1 Point)                  [ ] History of pregnancy complications                 (1 point)  [ ] Pneumonia or serious lung disease                                               [ ] Unexplained or recurrent                     (1 Point)           (in the previous month)                               (1 Point)  [ ] Abnormal pulmonary function test                     (1 Point)                 SURGERY RELATED RISK FACTORS  [ ] Acute myocardial infarction                              (1 Point)                 [ ]  Section                                             (1 Point)  [ ] Congestive heart failure (in the previous month)  (1 Point)               [ ] Minor surgery                                                  (1 Point)   [ ] Inflammatory bowel disease                             (1 Point)                 [ ] Arthroscopic surgery                                        (2 Points)  [ ] Central venous access                                      (2 Points)                [ ] General surgery lasting more than 45 minutes   (2 Points)       [ ] Stroke (in the previous month)                          (5 Points)               [ ] Elective arthroplasty                                         (5 Points)                                                                                                                                               HEMATOLOGY RELATED FACTORS                                                 TRAUMA RELATED RISK FACTORS  [ ] Prior episodes of VTE                                     (3 Points)                 [ ] Fracture of the hip, pelvis, or leg                       (5 Points)  [ ] Positive family history for VTE                         (3 Points)                 [ ] Acute spinal cord injury (in the previous month)  (5 Points)  [ ] Prothrombin 27546 A                                     (3 Points)                 [ ] Paralysis  (less than 1 month)                             (5 Points)  [ ] Factor V Leiden                                             (3 Points)                  [ ] Multiple Trauma within 1 month                        (5 Points)  [ ] Lupus anticoagulants                                     (3 Points)                                                           [ ] Anticardiolipin antibodies                               (3 Points)                                                       [ ] High homocysteine in the blood                      (3 Points)                                             [ ] Other congenital or acquired thrombophilia      (3 Points)                                                [ ] Heparin induced thrombocytopenia                  (3 Points)                                          Total Score [    1      ]

## 2019-09-28 NOTE — DISCHARGE NOTE PROVIDER - CARE PROVIDER_API CALL
Paulo Dye)  [facility]  [address], [address]  [place] [address]  Phone: [phone]  Fax: [phone]  Follow Up Time:

## 2019-09-28 NOTE — DISCHARGE NOTE PROVIDER - HOSPITAL COURSE
60 yo M with HTN presents with chest pain. Trops were negative x 3, ECHO normal EF, mildly dilated aorta, CTA chest/abdomen negative for aortic dissection. Chest pain resolved, likely acid reflux, will trial with PPI. Pt was evaluated by Cardiology, medically stable for discharge to follow up with Cardiology outpt for stress test.

## 2019-09-28 NOTE — DISCHARGE NOTE PROVIDER - NSDCCPCAREPLAN_GEN_ALL_CORE_FT
PRINCIPAL DISCHARGE DIAGNOSIS  Diagnosis: Chest pain, unspecified type  Assessment and Plan of Treatment:       SECONDARY DISCHARGE DIAGNOSES  Diagnosis: HTN (hypertension)  Assessment and Plan of Treatment:

## 2019-09-28 NOTE — PROGRESS NOTE ADULT - SUBJECTIVE AND OBJECTIVE BOX
Patient is a 59y old  Male who presents with a chief complaint of CP (27 Sep 2019 18:11)      Interval HPI/ Overnight events: No overnight events    Patient seen and examined at bedside, states that chest pain has resolved, has discomfort near his throat    REVIEW OF SYSTEMS:    CONSTITUTIONAL: No weakness, fevers or chills  EYES/ENT: No visual changes;  No vertigo   NECK: No pain or stiffness  RESPIRATORY: No cough, wheezing, hemoptysis; No shortness of breath  CARDIOVASCULAR: No chest pain or palpitations  GASTROINTESTINAL: No abdominal or epigastric pain. No nausea, vomiting, or hematemesis; No diarrhea or constipation. No melena or hematochezia.  GENITOURINARY: No dysuria, frequency or hematuria  NEUROLOGICAL: No numbness or weakness  SKIN: No itching, burning, rashes, or lesions   MSK: no joint pain, no joint swelling  All other review of systems is negative unless indicated above.      ALLERGIES:  No Known Allergies    MEDICATIONS  (STANDING):  aspirin  chewable 81 milliGRAM(s) Oral daily  hydrochlorothiazide 25 milliGRAM(s) Oral daily  losartan 50 milliGRAM(s) Oral daily  pantoprazole    Tablet 40 milliGRAM(s) Oral before breakfast  potassium chloride    Tablet ER 40 milliEquivalent(s) Oral every 4 hours    MEDICATIONS  (PRN):  benzocaine 15 mG/menthol 3.6 mG Lozenge 1 Lozenge Oral every 2 hours PRN Sore Throat  nitroglycerin    2% Ointment 1 Inch(s) Transdermal every 6 hours PRN chest pain    Vital Signs Last 24 Hrs  T(F): 98.2 (28 Sep 2019 10:50), Max: 100 (27 Sep 2019 18:41)  HR: 96 (28 Sep 2019 10:50) (96 - 111)  BP: 138/80 (28 Sep 2019 10:50) (123/74 - 154/91)  RR: 16 (28 Sep 2019 10:50) (15 - 18)  SpO2: 100% (28 Sep 2019 10:50) (95% - 100%)  I&O's Summary    27 Sep 2019 07:01  -  28 Sep 2019 07:00  --------------------------------------------------------  IN: 0 mL / OUT: 1 mL / NET: -1 mL        PHYSICAL EXAM:  General: NAD, well dressed, well nourished  Head: NT/AC  ENT: MMM, no oropharyngeal erythema or exudates  Neck: Supple, No JVD  Lungs: Clear to auscultation bilaterally, no wheezing, rales, or rhonchi, no accessory muscle use  Cardio: RRR, normal S1/S2, No M/R/G  Abdomen: Normoactive BS, Abdomen soft, nontender, nondistended; no organomegaly  Extremities: No calf tenderness, no clubbing or  cyanosis  Vascular: no LE edema  Skin: warm and dry  Neuro: AAOx3, answers all questions appropriately, moves all extremities    LABS:                        15.1   9.20  )-----------( 182      ( 28 Sep 2019 06:30 )             42.9     09-28    137  |  100  |  12  ----------------------------<  123  3.3   |  26  |  1.08    Ca    9.6      28 Sep 2019 06:30  Mg     2.1     09-28      eGFR if Non African American: 76 mL/min/1.73M2 (09-28-19 @ 06:30)  eGFR if : 88 mL/min/1.73M2 (09-28-19 @ 06:30)        CARDIAC MARKERS ( 27 Sep 2019 21:00 )  <.017 ng/mL / x     / x     / x     / x      CARDIAC MARKERS ( 27 Sep 2019 18:05 )  <.017 ng/mL / x     / x     / x     / x      CARDIAC MARKERS ( 27 Sep 2019 15:30 )  <.017 ng/mL / x     / x     / x     / x          09-28 Chol 140 mg/dL LDL 74 mg/dL HDL 50 mg/dL Trig 80 mg/dL      RADIOLOGY & ADDITIONAL TESTS:    < from: CT Angio Abdomen and Pelvis w/ IV Cont (09.27.19 @ 17:19) >  FINDINGS:    CHEST:     LUNGS AND LARGE AIRWAYS: Patent central airways. No pulmonary nodules.   Atelectatic changes.  PLEURA: No pleural effusion.  VESSELS: Within normal limits. The aorta is normal in appearance. There   is no evidence for dissection.,  HEART: Heart size is normal. No pericardial effusion.  MEDIASTINUM AND MAYLIN: No lymphadenopathy.  CHEST WALL AND LOWER NECK: Within normal limits.    ABDOMEN AND PELVIS:    LIVER: The liver is low in attenuation comparison the spleen suggesting   fatty infiltration.  BILE DUCTS: Normal caliber.  GALLBLADDER: Within normal limits.  SPLEEN: Within normal limits.  PANCREAS: Within normal limits.  ADRENALS: Within normal limits.  KIDNEYS/URETERS: Within normal limits.    BLADDER: Within normal limits.  REPRODUCTIVE ORGANS: Prostate gland is mildly prominent in size. Please   correlate clinically.    BOWEL: Colonic diverticuli are present predominantly in the descending   and sigmoid colon. There is mild soft tissue stranding adjacent to a   thick walled diverticuli in the proximal sigmoid colon raising concern   for mildacute diverticulitis. No discrete collection or extraluminal air   is identified. Appendix normal in appearance.  PERITONEUM: No ascites.  VESSELS: There is mild ectasia of the common iliac arteries bilaterally   measuring up to 1.8 cm on the right and 1.7 cm on the left.  RETROPERITONEUM/LYMPH NODES: No lymphadenopathy.    ABDOMINAL WALL: Within normal limits.  BONES: Mild degenerative changes of bone. Nonspecific 5 mm sclerotic   density in the right iliac bone. This may represent a bone island. There   is minimal scoliosis.    IMPRESSION:     Mild diverticulitis in the proximal sigmoid colon.    No evidence for aortic dissection. Hepatic steatosis. Additional findings   as above.    < end of copied text >        Care Discussed with Consultants/Other Providers:

## 2019-09-28 NOTE — CONSULT NOTE ADULT - ASSESSMENT
Assessment and Plan:    Assessment:  · Assessment		  60 yo M with pmhx of HTN presents for CP that started 2am the day of presentation.     # atypical chest pain , CTA neg  - r/o GERD  -stress test  - ASA 81mg   - trial of protonix 40mg qD  - Nitro ointment prn  - follow up cardiology  - lipid panel, hba1c    # subjective fever, sore throat/ cough - likely viral illness  - supportive care  - lozenges prn    # HTN  - c/w HCTZ and losartan    # incidental finding of possible mild diverticulitis  - pt is asymptomatic, NO abd pain, n/v/d  - monitor     # Hypomagnesium  - repleted    # DVT ppx: ambulate    IMPROVE VTE Individual Risk Assessment    RISK                                                                Points    [  ] Previous VTE                                                  3    [  ] Thrombophilia                                               2    [  ] Lower limb paralysis                                      2        (unable to hold up >15 seconds)      [  ] Current Cancer                                              2         (within 6 months)    [  ] Immobilization > 24 hrs                                1    [  ] ICU/CCU stay > 24 hours                              1    [  ] Age > 60                                                      1    IMPROVE VTE Score _____0____    IMPROVE Score 0-1: Low Risk, No VTE prophylaxis required for most patients, encourage ambulation.   IMPROVE Score 2-3: At risk, pharmacologic VTE prophylaxis is indicated for most patients (in the absence of a contraindication)  IMPROVE Score > or = 4: High Risk, pharmacologic VTE prophylaxis is indicated for most patients (in the absence of a contraindication)

## 2019-09-28 NOTE — DISCHARGE NOTE NURSING/CASE MANAGEMENT/SOCIAL WORK - PATIENT PORTAL LINK FT
You can access the FollowMyHealth Patient Portal offered by [facility] by registering at the following website: [email or web address]. By joining [facility]’s FollowMyHealth portal, you will also be able to view your health information using other applications (apps) compatible with our system.

## 2019-09-28 NOTE — CONSULT NOTE ADULT - SUBJECTIVE AND OBJECTIVE BOX
Chief Complaint: chest pain    HPI:  History of Present Illness:  Reason for Admission: CP	  History of Present Illness: 	  60 yo M with pmhx of HTN presents for CP that started 2am the day of presentation. Pt reported substernal CP around 2 am that woke him up, pain radiates to the throat, described as pressure like pain, constant, intensity varies between 4 to 7/10. Pain is worse with bending down and coughing and gets better in a reclining position/sitting up. Pt also states that he had three slices of pizza right before sleeping and chest pain felt like acid reflux at times.  Pt reports subjective fever and sore throat with slight cough for one day. Pt denies SOB, palpitations, abd pain ,n/v/d, dysuria, or leg swelling.   Pt also reports he had a negative exercise nuclear stress test two years ago. 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        PMH:   Essential hypertension    PSH:   No significant past surgical history    Family History:  FAMILY HISTORY:  FH: stroke: grandfather      Social History:  Smoking:  Alcohol:  Drugs:    Allergies:  No Known Allergies      Medications:  aspirin  chewable 81 milliGRAM(s) Oral daily  benzocaine 15 mG/menthol 3.6 mG Lozenge 1 Lozenge Oral every 2 hours PRN  hydrochlorothiazide 25 milliGRAM(s) Oral daily  losartan 50 milliGRAM(s) Oral daily  nitroglycerin    2% Ointment 1 Inch(s) Transdermal every 6 hours PRN  pantoprazole    Tablet 40 milliGRAM(s) Oral before breakfast      REVIEW OF SYSTEMS:  CONSTITUTIONAL: No fever, weight loss, or fatigue  EYES: No eye pain, visual disturbances, or discharge  ENMT:  No difficulty hearing, tinnitus, vertigo; No sinus or throat pain  NECK: No pain or stiffness  BREASTS: No pain, masses, or nipple discharge  RESPIRATORY: No cough, wheezing, chills or hemoptysis; No shortness of breath  CARDIOVASCULAR: No chest pain, palpitations, dizziness, or leg swelling  GASTROINTESTINAL: No abdominal or epigastric pain. No nausea, vomiting, or hematemesis; No diarrhea or constipation. No melena or hematochezia.  GENITOURINARY: No dysuria, frequency, hematuria, or incontinence  NEUROLOGICAL: No headaches, memory loss, loss of strength, numbness, or tremors  SKIN: No itching, burning, rashes, or lesions   LYMPH NODES: No enlarged glands  ENDOCRINE: No heat or cold intolerance; No hair loss  MUSCULOSKELETAL: No joint pain or swelling; No muscle, back, or extremity pain  PSYCHIATRIC: No depression, anxiety, mood swings, or difficulty sleeping  HEME/LYMPH: No easy bruising, or bleeding gums  ALLERY AND IMMUNOLOGIC: No hives or eczema    Physical Exam:  T(C): 36.8 (19 @ 10:50), Max: 37.5 (19 @ 06:13)  HR: 96 (19 @ 10:50) (96 - 111)  BP: 138/80 (19 @ 10:50) (123/74 - 144/85)  RR: 16 (19 @ 10:50) (15 - 16)  SpO2: 100% (19 @ 10:50) (95% - 100%)  Wt(kg): --    GENERAL: NAD, well-groomed, well-developed  HEAD:  Atraumatic, Normocephalic  EYES: EOMI, conjunctiva and sclera clear  ENT: Moist mucous membranes,  NECK: Supple, No JVD, no bruits  CHEST/LUNG: Clear to percussion bilaterally; No rales, rhonchi, wheezing, or rubs  HEART: Regular rate and rhythm; No murmurs, rubs, or gallops PMI non displaced.  ABDOMEN: Soft, Nontender, Nondistended; Bowel sounds present  EXTREMITIES:  2+ Peripheral Pulses, No clubbing, cyanosis, or edema  SKIN: No rashes or lesions  NERVOUS SYSTEM:  Alert & Oriented X3, Good concentration; Motor Strength 5/5 B/L upper and lower extremities; DTRs 2+ intact and symmetric    Cardiovascular Diagnostic Testing:  ECG: < from: 12 Lead ECG (19 @ 15:07) >  Diagnosis Line Sinus tachycardiawith premature supraventricular complexes  Nonspecific ST abnormality  Abnormal ECG  No previous ECGs available  Confirmed by DAVID MOODY, TRUDY VINCENT () on 2019 10:21:36 AM    < end of copied text >  echo  < from: TTE Echo Complete w/Doppler (19 @ 08:20) >  Summary:   1. Left ventricular ejection fraction, by visual estimation, is 60 to   65%.   2. Spectral Doppler shows impaired relaxation pattern of left   ventricular myocardial filling (Grade I diastolic dysfunction).   3. Mild to moderate aortic regurgitation.   4. Dilatation of the aortic root.   5. Pulmonary hypertension is absent.   6. LA volume Index is 17.6 ml/m² ml/m2.    [id number] Trudy Dye MD, [facility] , Electronically signed on 2019 at   1:32:20 PM              < end of copied text >      Labs:                        15.1   9.20  )-----------( 182      ( 28 Sep 2019 06:30 )             42.9         137  |  100  |  12  ----------------------------<  123<H>  3.3<L>   |  26  |  1.08    Ca    9.6      28 Sep 2019 06:30  Mg     2.1             CARDIAC MARKERS ( 27 Sep 2019 21:00 )  <.017 ng/mL / x     / x     / x     / x      CARDIAC MARKERS ( 27 Sep 2019 18:05 )  <.017 ng/mL / x     / x     / x     / x      CARDIAC MARKERS ( 27 Sep 2019 15:30 )  <.017 ng/mL / x     / x     / x     / x            Total Cholesterol: 140  LDL: 74  HDL: 50  T    Hemoglobin A1C, Whole Blood: 5.6 % ( @ 06:30)      ASSESMENT AND PLAN:

## 2019-10-04 ENCOUNTER — MESSAGE (OUTPATIENT)
Age: 59
End: 2019-10-04

## 2019-10-04 DIAGNOSIS — R07.89 OTHER CHEST PAIN: ICD-10-CM

## 2019-10-07 PROBLEM — I10 ESSENTIAL (PRIMARY) HYPERTENSION: Chronic | Status: ACTIVE | Noted: 2019-09-27

## 2019-11-13 ENCOUNTER — APPOINTMENT (OUTPATIENT)
Dept: GASTROENTEROLOGY | Facility: AMBULATORY MEDICAL SERVICES | Age: 59
End: 2019-11-13

## 2020-02-20 ENCOUNTER — APPOINTMENT (OUTPATIENT)
Dept: FAMILY MEDICINE | Facility: CLINIC | Age: 60
End: 2020-02-20
Payer: COMMERCIAL

## 2020-02-20 VITALS
HEIGHT: 71 IN | WEIGHT: 228 LBS | HEART RATE: 63 BPM | BODY MASS INDEX: 31.92 KG/M2 | OXYGEN SATURATION: 99 % | SYSTOLIC BLOOD PRESSURE: 124 MMHG | DIASTOLIC BLOOD PRESSURE: 79 MMHG | RESPIRATION RATE: 15 BRPM

## 2020-02-20 DIAGNOSIS — Z87.448 PERSONAL HISTORY OF OTHER DISEASES OF URINARY SYSTEM: ICD-10-CM

## 2020-02-20 PROCEDURE — 99214 OFFICE O/P EST MOD 30 MIN: CPT

## 2020-02-24 NOTE — HISTORY OF PRESENT ILLNESS
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

## 2020-08-18 ENCOUNTER — RX RENEWAL (OUTPATIENT)
Age: 60
End: 2020-08-18

## 2020-09-14 ENCOUNTER — APPOINTMENT (OUTPATIENT)
Dept: FAMILY MEDICINE | Facility: CLINIC | Age: 60
End: 2020-09-14
Payer: COMMERCIAL

## 2020-09-14 VITALS
RESPIRATION RATE: 14 BRPM | WEIGHT: 232.44 LBS | TEMPERATURE: 98.6 F | DIASTOLIC BLOOD PRESSURE: 74 MMHG | SYSTOLIC BLOOD PRESSURE: 122 MMHG | BODY MASS INDEX: 32.54 KG/M2 | HEART RATE: 78 BPM | HEIGHT: 71 IN | OXYGEN SATURATION: 97 %

## 2020-09-14 PROCEDURE — 99214 OFFICE O/P EST MOD 30 MIN: CPT

## 2020-09-14 NOTE — HISTORY OF PRESENT ILLNESS
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

## 2020-10-17 NOTE — ED PROVIDER NOTE - CHPI ED SYMPTOMS NEG
Area of cytotoxic cerebral edema identified when reviewing brain imaging in the territory of the right middle cerebral artery. There is no mass effect associated with it. We will continue to monitor the patients clinical exam for any worsening of symptoms which may indicate expansion of the stroke or the area of the edema resulting in the clinical change. The pattern is suggestive of embolic etiology related to hypercoagulable state.   no back pain/no vomiting/no dizziness/no fever/no nausea

## 2020-11-09 ENCOUNTER — RX RENEWAL (OUTPATIENT)
Age: 60
End: 2020-11-09

## 2020-12-21 PROBLEM — Z12.11 ENCOUNTER FOR SCREENING COLONOSCOPY: Status: RESOLVED | Noted: 2019-01-24 | Resolved: 2020-12-21

## 2021-02-07 ENCOUNTER — RX RENEWAL (OUTPATIENT)
Age: 61
End: 2021-02-07

## 2021-02-16 ENCOUNTER — APPOINTMENT (OUTPATIENT)
Dept: FAMILY MEDICINE | Facility: CLINIC | Age: 61
End: 2021-02-16
Payer: COMMERCIAL

## 2021-02-16 VITALS
TEMPERATURE: 97.9 F | DIASTOLIC BLOOD PRESSURE: 86 MMHG | HEART RATE: 68 BPM | WEIGHT: 237.5 LBS | OXYGEN SATURATION: 98 % | RESPIRATION RATE: 12 BRPM | SYSTOLIC BLOOD PRESSURE: 118 MMHG | BODY MASS INDEX: 33.12 KG/M2

## 2021-02-16 PROCEDURE — 99214 OFFICE O/P EST MOD 30 MIN: CPT

## 2021-02-16 PROCEDURE — 99072 ADDL SUPL MATRL&STAF TM PHE: CPT

## 2021-02-16 RX ORDER — PANTOPRAZOLE 40 MG/1
40 TABLET, DELAYED RELEASE ORAL
Qty: 15 | Refills: 0 | Status: DISCONTINUED | COMMUNITY
Start: 2019-09-28 | End: 2021-02-16

## 2021-02-16 NOTE — PHYSICAL EXAM
[Normal] : normal rate, regular rhythm, normal S1 and S2 and no murmur heard [No Focal Deficits] : no focal deficits

## 2021-02-16 NOTE — HISTORY OF PRESENT ILLNESS
[FreeTextEntry1] : here for bp recheck [de-identified] : patient overall feels fine, stil has not obtained blood work or liver us\par retiring soon from [employer]\par feels well

## 2021-08-11 ENCOUNTER — RX RENEWAL (OUTPATIENT)
Age: 61
End: 2021-08-11

## 2021-11-16 ENCOUNTER — APPOINTMENT (OUTPATIENT)
Dept: FAMILY MEDICINE | Facility: CLINIC | Age: 61
End: 2021-11-16
Payer: COMMERCIAL

## 2021-11-16 VITALS
HEIGHT: 71 IN | DIASTOLIC BLOOD PRESSURE: 90 MMHG | RESPIRATION RATE: 16 BRPM | BODY MASS INDEX: 34.51 KG/M2 | SYSTOLIC BLOOD PRESSURE: 130 MMHG | OXYGEN SATURATION: 95 % | TEMPERATURE: 97.5 F | WEIGHT: 246.5 LBS | HEART RATE: 78 BPM

## 2021-11-16 VITALS — DIASTOLIC BLOOD PRESSURE: 84 MMHG | SYSTOLIC BLOOD PRESSURE: 130 MMHG

## 2021-11-16 PROCEDURE — 99213 OFFICE O/P EST LOW 20 MIN: CPT

## 2021-11-16 NOTE — PLAN
[FreeTextEntry1] : \par Fatty liver: advised patient to go for us has not been able to schedule. Advised patient to call with any questions or concerns. Patient verbalized understanding. \par \par HTN: discussed with patient elevated at office today, get bp cuff at home to keep log of readings at home next two weeks and call office to let us know what pressure is at home. discussed if continuously elevated readings will need to increase bp meds, educated risks of elevated bp put him at risk for stroke, heart disease, death etc. Patient verbalized understanding. \par \par Patient to f/u office 3 months or sooner if indicated based on bp readings. Sent 90 day supply of medication needs blood work for further renewals. Advised patient to call with any questions or concerns. Patient verbalized understanding.

## 2021-11-16 NOTE — PHYSICAL EXAM
[Normal Sclera/Conjunctiva] : normal sclera/conjunctiva [Normal Gait] : normal gait [Normal] : affect was normal and insight and judgment were intact

## 2021-11-16 NOTE — HISTORY OF PRESENT ILLNESS
[FreeTextEntry1] : f/u [de-identified] : Patient here today for f/u on htn. Patient reports he has not gone for blood work or us of liver bc of covid. He reports he does not check blood pressure at home because he does not think his machine records accurate pressures. Pt denies any SOB, cough, chest pain, palpitations, N/V/D/C, fever, or chills. No other health concerns today.

## 2022-02-10 ENCOUNTER — APPOINTMENT (OUTPATIENT)
Dept: FAMILY MEDICINE | Facility: CLINIC | Age: 62
End: 2022-02-10
Payer: COMMERCIAL

## 2022-02-10 VITALS
TEMPERATURE: 97.8 F | DIASTOLIC BLOOD PRESSURE: 84 MMHG | WEIGHT: 232 LBS | BODY MASS INDEX: 32.48 KG/M2 | HEART RATE: 78 BPM | OXYGEN SATURATION: 96 % | RESPIRATION RATE: 16 BRPM | HEIGHT: 71 IN | SYSTOLIC BLOOD PRESSURE: 120 MMHG

## 2022-02-10 PROCEDURE — 99213 OFFICE O/P EST LOW 20 MIN: CPT

## 2022-04-05 ENCOUNTER — NON-APPOINTMENT (OUTPATIENT)
Age: 62
End: 2022-04-05

## 2022-04-05 LAB
25(OH)D3 SERPL-MCNC: 51 NG/ML
ALBUMIN SERPL ELPH-MCNC: 4.7 G/DL
ALP BLD-CCNC: 47 U/L
ALT SERPL-CCNC: 20 U/L
ANION GAP SERPL CALC-SCNC: 14 MMOL/L
APPEARANCE: CLEAR
AST SERPL-CCNC: 22 U/L
BACTERIA: NEGATIVE
BASOPHILS # BLD AUTO: 0.05 K/UL
BASOPHILS NFR BLD AUTO: 1 %
BILIRUB SERPL-MCNC: 0.5 MG/DL
BILIRUBIN URINE: NEGATIVE
BLOOD URINE: NEGATIVE
BUN SERPL-MCNC: 14 MG/DL
CALCIUM SERPL-MCNC: 9.4 MG/DL
CHLORIDE SERPL-SCNC: 100 MMOL/L
CHOLEST SERPL-MCNC: 161 MG/DL
CO2 SERPL-SCNC: 23 MMOL/L
COLOR: YELLOW
CREAT SERPL-MCNC: 1.08 MG/DL
EGFR: 79 ML/MIN/1.73M2
EOSINOPHIL # BLD AUTO: 0.11 K/UL
EOSINOPHIL NFR BLD AUTO: 2.1 %
ESTIMATED AVERAGE GLUCOSE: 128 MG/DL
GLUCOSE QUALITATIVE U: NEGATIVE
GLUCOSE SERPL-MCNC: 126 MG/DL
HBA1C MFR BLD HPLC: 6.1 %
HCT VFR BLD CALC: 43.6 %
HDLC SERPL-MCNC: 43 MG/DL
HGB BLD-MCNC: 14.9 G/DL
HYALINE CASTS: 1 /LPF
IMM GRANULOCYTES NFR BLD AUTO: 0.2 %
KETONES URINE: NEGATIVE
LDLC SERPL CALC-MCNC: 101 MG/DL
LEUKOCYTE ESTERASE URINE: NEGATIVE
LYMPHOCYTES # BLD AUTO: 1.51 K/UL
LYMPHOCYTES NFR BLD AUTO: 29 %
MAN DIFF?: NORMAL
MCHC RBC-ENTMCNC: 32 PG
MCHC RBC-ENTMCNC: 34.2 GM/DL
MCV RBC AUTO: 93.8 FL
MICROSCOPIC-UA: NORMAL
MONOCYTES # BLD AUTO: 0.51 K/UL
MONOCYTES NFR BLD AUTO: 9.8 %
NEUTROPHILS # BLD AUTO: 3.02 K/UL
NEUTROPHILS NFR BLD AUTO: 57.9 %
NITRITE URINE: NEGATIVE
NONHDLC SERPL-MCNC: 118 MG/DL
PH URINE: 6.5
PLATELET # BLD AUTO: 173 K/UL
POTASSIUM SERPL-SCNC: 4.1 MMOL/L
PROT SERPL-MCNC: 6.4 G/DL
PROTEIN URINE: NEGATIVE
PSA SERPL-MCNC: 2.88 NG/ML
RBC # BLD: 4.65 M/UL
RBC # FLD: 12.8 %
RED BLOOD CELLS URINE: 2 /HPF
SODIUM SERPL-SCNC: 137 MMOL/L
SPECIFIC GRAVITY URINE: 1.01
SQUAMOUS EPITHELIAL CELLS: 0 /HPF
TRIGL SERPL-MCNC: 86 MG/DL
TSH SERPL-ACNC: 2.04 UIU/ML
UROBILINOGEN URINE: NORMAL
WBC # FLD AUTO: 5.21 K/UL
WHITE BLOOD CELLS URINE: 1 /HPF

## 2022-05-05 ENCOUNTER — OUTPATIENT (OUTPATIENT)
Dept: OUTPATIENT SERVICES | Facility: HOSPITAL | Age: 62
LOS: 1 days | End: 2022-05-05
Payer: COMMERCIAL

## 2022-05-05 ENCOUNTER — NON-APPOINTMENT (OUTPATIENT)
Age: 62
End: 2022-05-05

## 2022-05-05 ENCOUNTER — APPOINTMENT (OUTPATIENT)
Dept: ULTRASOUND IMAGING | Facility: HOSPITAL | Age: 62
End: 2022-05-05
Payer: COMMERCIAL

## 2022-05-05 DIAGNOSIS — K76.0 FATTY (CHANGE OF) LIVER, NOT ELSEWHERE CLASSIFIED: ICD-10-CM

## 2022-05-05 PROCEDURE — 76705 ECHO EXAM OF ABDOMEN: CPT | Mod: 26

## 2022-05-05 PROCEDURE — 76705 ECHO EXAM OF ABDOMEN: CPT

## 2022-05-10 ENCOUNTER — APPOINTMENT (OUTPATIENT)
Dept: FAMILY MEDICINE | Facility: CLINIC | Age: 62
End: 2022-05-10
Payer: COMMERCIAL

## 2022-05-10 VITALS — SYSTOLIC BLOOD PRESSURE: 128 MMHG | DIASTOLIC BLOOD PRESSURE: 80 MMHG

## 2022-05-10 VITALS
DIASTOLIC BLOOD PRESSURE: 80 MMHG | SYSTOLIC BLOOD PRESSURE: 130 MMHG | WEIGHT: 229 LBS | BODY MASS INDEX: 32.06 KG/M2 | TEMPERATURE: 98 F | OXYGEN SATURATION: 99 % | HEART RATE: 74 BPM | RESPIRATION RATE: 18 BRPM | HEIGHT: 71 IN

## 2022-05-10 DIAGNOSIS — R97.20 ELEVATED PROSTATE, SPECIFIC ANTIGEN [PSA]: ICD-10-CM

## 2022-05-10 DIAGNOSIS — K76.0 FATTY (CHANGE OF) LIVER, NOT ELSEWHERE CLASSIFIED: ICD-10-CM

## 2022-05-10 PROCEDURE — 99213 OFFICE O/P EST LOW 20 MIN: CPT

## 2022-06-15 ENCOUNTER — APPOINTMENT (OUTPATIENT)
Dept: CT IMAGING | Facility: HOSPITAL | Age: 62
End: 2022-06-15

## 2022-09-29 ENCOUNTER — APPOINTMENT (OUTPATIENT)
Dept: GASTROENTEROLOGY | Facility: CLINIC | Age: 62
End: 2022-09-29
Payer: COMMERCIAL

## 2022-09-29 ENCOUNTER — NON-APPOINTMENT (OUTPATIENT)
Age: 62
End: 2022-09-29

## 2022-09-29 VITALS
RESPIRATION RATE: 18 BRPM | DIASTOLIC BLOOD PRESSURE: 80 MMHG | SYSTOLIC BLOOD PRESSURE: 125 MMHG | TEMPERATURE: 98 F | OXYGEN SATURATION: 98 % | WEIGHT: 220 LBS | BODY MASS INDEX: 30.8 KG/M2 | HEIGHT: 71 IN | HEART RATE: 78 BPM

## 2022-09-29 DIAGNOSIS — K21.9 GASTRO-ESOPHAGEAL REFLUX DISEASE W/OUT ESOPHAGITIS: ICD-10-CM

## 2022-09-29 DIAGNOSIS — Z86.010 PERSONAL HISTORY OF COLONIC POLYPS: ICD-10-CM

## 2022-09-29 PROCEDURE — 99204 OFFICE O/P NEW MOD 45 MIN: CPT

## 2022-09-29 NOTE — HISTORY OF PRESENT ILLNESS
[FreeTextEntry1] : 62-year-old male\par Recently retired \par Last colonoscopy 3 years ago, adenomas removed, due for surveillance\par No change in bowel habits since last examination\par No new family history of colon cancer\par Patient with intermittent reflux, without dysphagia\par \par Denies history of coronary disease congestive heart failure or dysrhythmia

## 2022-10-11 ENCOUNTER — NON-APPOINTMENT (OUTPATIENT)
Age: 62
End: 2022-10-11

## 2022-10-23 LAB — SARS-COV-2 N GENE NPH QL NAA+PROBE: NOT DETECTED

## 2022-10-26 ENCOUNTER — APPOINTMENT (OUTPATIENT)
Dept: GASTROENTEROLOGY | Facility: AMBULATORY MEDICAL SERVICES | Age: 62
End: 2022-10-26

## 2022-10-26 PROCEDURE — 43239 EGD BIOPSY SINGLE/MULTIPLE: CPT

## 2022-10-26 PROCEDURE — 45385 COLONOSCOPY W/LESION REMOVAL: CPT | Mod: 33

## 2022-11-03 ENCOUNTER — NON-APPOINTMENT (OUTPATIENT)
Age: 62
End: 2022-11-03

## 2023-01-25 ENCOUNTER — APPOINTMENT (OUTPATIENT)
Dept: FAMILY MEDICINE | Facility: CLINIC | Age: 63
End: 2023-01-25
Payer: COMMERCIAL

## 2023-01-25 VITALS
TEMPERATURE: 95.5 F | DIASTOLIC BLOOD PRESSURE: 82 MMHG | OXYGEN SATURATION: 98 % | SYSTOLIC BLOOD PRESSURE: 130 MMHG | WEIGHT: 217 LBS | RESPIRATION RATE: 16 BRPM | HEIGHT: 71 IN | HEART RATE: 74 BPM | BODY MASS INDEX: 30.38 KG/M2

## 2023-01-25 DIAGNOSIS — H91.90 UNSPECIFIED HEARING LOSS, UNSPECIFIED EAR: ICD-10-CM

## 2023-01-25 PROCEDURE — 99214 OFFICE O/P EST MOD 30 MIN: CPT

## 2023-01-25 NOTE — ASSESSMENT
[FreeTextEntry1] : 61 yo M PMH HTN, prediabetes, fatty liver. former smoker (quit 1980s, pack year: 9) presenting today for HTN follow-up.

## 2023-01-25 NOTE — PLAN
[FreeTextEntry1] : #HTN \par -stable \par -continue current regimen of candestartan-HCTZ 32-25 QD, refilled \par -follow-up for CPE \par \par #Hearing loss \par -follow-up with ENT, referral provided \par \par HCM: colonoscopy 11/22 -due 2032

## 2023-01-25 NOTE — HISTORY OF PRESENT ILLNESS
[FreeTextEntry1] : HTN follow-up  [de-identified] : 63 yo M PMH HTN, prediabetes, fatty liver. former smoker (quit 1980s, pack year: 9) presenting today for HTN follow-up. \par He states that his hearing has become progressively worse over the past 2 years. Reports that he has had bad hearing for many years, was evaluated in the past, told at that time he did not need a hearing aid but would likely need it in the future.  \par \par \par social: retired , lives with wife and two kids

## 2023-06-08 ENCOUNTER — NON-APPOINTMENT (OUTPATIENT)
Age: 63
End: 2023-06-08

## 2023-06-09 ENCOUNTER — NON-APPOINTMENT (OUTPATIENT)
Age: 63
End: 2023-06-09

## 2023-06-09 ENCOUNTER — APPOINTMENT (OUTPATIENT)
Dept: OTOLARYNGOLOGY | Facility: CLINIC | Age: 63
End: 2023-06-09
Payer: COMMERCIAL

## 2023-06-09 VITALS
DIASTOLIC BLOOD PRESSURE: 80 MMHG | HEART RATE: 74 BPM | SYSTOLIC BLOOD PRESSURE: 134 MMHG | BODY MASS INDEX: 30.8 KG/M2 | HEIGHT: 71 IN | WEIGHT: 220 LBS

## 2023-06-09 DIAGNOSIS — J31.0 CHRONIC RHINITIS: ICD-10-CM

## 2023-06-09 DIAGNOSIS — H69.81 OTHER SPECIFIED DISORDERS OF EUSTACHIAN TUBE, RIGHT EAR: ICD-10-CM

## 2023-06-09 DIAGNOSIS — Z86.79 PERSONAL HISTORY OF OTHER DISEASES OF THE CIRCULATORY SYSTEM: ICD-10-CM

## 2023-06-09 PROCEDURE — 99204 OFFICE O/P NEW MOD 45 MIN: CPT | Mod: 25

## 2023-06-09 PROCEDURE — 92511 NASOPHARYNGOSCOPY: CPT

## 2023-06-09 PROCEDURE — 92567 TYMPANOMETRY: CPT

## 2023-06-09 PROCEDURE — 92557 COMPREHENSIVE HEARING TEST: CPT

## 2023-06-09 RX ORDER — AZELASTINE HYDROCHLORIDE 137 UG/1
137 SPRAY, METERED NASAL
Qty: 1 | Refills: 3 | Status: ACTIVE | COMMUNITY
Start: 2023-06-09 | End: 1900-01-01

## 2023-06-09 RX ORDER — FLUTICASONE PROPIONATE 50 UG/1
50 SPRAY, METERED NASAL TWICE DAILY
Qty: 1 | Refills: 3 | Status: ACTIVE | COMMUNITY
Start: 2023-06-09 | End: 1900-01-01

## 2023-06-09 NOTE — CONSULT LETTER
[Dear  ___] : Dear  [unfilled], [Consult Letter:] : I had the pleasure of evaluating your patient, [unfilled]. [Please see my note below.] : Please see my note below. [Consult Closing:] : Thank you very much for allowing me to participate in the care of this patient.  If you have any questions, please do not hesitate to contact me. [Sincerely,] : Sincerely, [FreeTextEntry3] : Power Jarvis M.D.

## 2023-06-09 NOTE — ASSESSMENT
[FreeTextEntry1] : Myles Salas presents for evaluation. He has chronic rhinitis and right eustachian tube dysfunction. Otoscopic exam was otherwise normal. Nasopharyngoscopy was performed and was normal. Audiogram was performed and reviewed showing type C tymp AD, type A tymp AS, mild to severe sensorineural hearing loss AU. HE is interested in amplification.\par \par - start fluticasone 2 sprays to each nostril BID x 1 month.\par - start azelastine 2 sprays to each nostril BID x 1 month.\par - hearing aid evaluation.\par - follow up in 1 month.\par \par

## 2023-06-09 NOTE — DATA REVIEWED
[de-identified] : AD: Type C\par AS: Type A\par mild to severe essentially SNL, 250-8000 Hz AU\par good/fair SRS AU\par REC: ENT f/u, re-eval per MD, consider AU hearing aids\par

## 2023-06-09 NOTE — REVIEW OF SYSTEMS
[Ear Pain] : ear pain [Hearing Loss] : hearing loss [Recurrent Ear Infections] : recurrent ear infections [Ear Noises] : ear noises [Nasal Congestion] : nasal congestion [Problem Snoring] : problem snoring [Sinus Pressure] : sinus pressure [Throat Clearing] : throat clearing [Joint Pain] : joint pain [Negative] : Heme/Lymph [FreeTextEntry6] : Noisy breathing

## 2023-06-09 NOTE — HISTORY OF PRESENT ILLNESS
[de-identified] : Myles Salas is a 62 yo male who was referred by Dr. Smalls for evaluation of hearing loss.  He notes gradually diminishing hearing over the past several years. He notes intermittent vibrating sensation inside her ears and he has to pop his ears. This is worse on the right. He notes intermittent tinnitus. He denies vertigo. He denies otalgia, otorrhea, or history of recurrent ear infections as an adult. He did have ear tubes as a child. He denies recent fevers or chills. He denies facial weakness or facial numbness. He denies family history of early onset hearing loss. He notes occupational noise exposure as a . He denies exposure to ototoxic medications or head trauma. He notes intermittent nasal congestion. He denies rhinorrhea or postnasal drainage. He denies sinus pressure. He denies history of recurrent sinusitis. He has not had allergy testing. He does not use nasal sprays. He does use Q-tips.

## 2023-06-09 NOTE — PHYSICAL EXAM
[] : septum deviated to the right [Midline] : trachea located in midline position [Removed] : palatine tonsils previously removed [Normal] : no rashes [de-identified] : Right EAC wnl. Clot and cerumen in left EAC.

## 2023-07-14 ENCOUNTER — APPOINTMENT (OUTPATIENT)
Dept: OTOLARYNGOLOGY | Facility: CLINIC | Age: 63
End: 2023-07-14

## 2023-08-02 ENCOUNTER — APPOINTMENT (OUTPATIENT)
Dept: FAMILY MEDICINE | Facility: CLINIC | Age: 63
End: 2023-08-02
Payer: COMMERCIAL

## 2023-08-02 VITALS
OXYGEN SATURATION: 98 % | HEIGHT: 71 IN | BODY MASS INDEX: 31.5 KG/M2 | SYSTOLIC BLOOD PRESSURE: 140 MMHG | RESPIRATION RATE: 16 BRPM | HEART RATE: 71 BPM | WEIGHT: 225 LBS | DIASTOLIC BLOOD PRESSURE: 83 MMHG | TEMPERATURE: 98.6 F

## 2023-08-02 DIAGNOSIS — Z13.29 ENCOUNTER FOR SCREENING FOR OTHER SUSPECTED ENDOCRINE DISORDER: ICD-10-CM

## 2023-08-02 PROCEDURE — 99213 OFFICE O/P EST LOW 20 MIN: CPT

## 2023-08-02 RX ORDER — CANDESARTAN CILEXETIL AND HYDROCHLOROTHIAZIDE 32; 25 MG/1; MG/1
32-25 TABLET ORAL
Qty: 90 | Refills: 1 | Status: DISCONTINUED | COMMUNITY
Start: 2021-02-16 | End: 2023-08-02

## 2023-08-06 NOTE — PLAN
[FreeTextEntry1] : #HTN  -stable  -will continue with candesartan 32 mg QD, D/C HCTZ and trial amlodipine 5 mg QD instead  -he will keep a BP log at home, call if any side effects, follow-up in 2 weeks or sooner if needed   -we discussed if symptoms do not improve/any other urinary issues f/u with urology

## 2023-08-06 NOTE — REVIEW OF SYSTEMS
[Negative] : Respiratory [Dysuria] : no dysuria [Incontinence] : no incontinence [Hesitancy] : no hesitancy [Hematuria] : no hematuria

## 2023-08-06 NOTE — HISTORY OF PRESENT ILLNESS
[FreeTextEntry1] : HTN follow-up   [de-identified] : 64 yo M PMH HTN, prediabetes, fatty liver. former smoker (quit 1980s, pack year: 9) presenting today for or HTN follow-up. 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   He needs refills on mediation, he is interested in trying another BP medication instead of HCTZ, to see if improvement on side effect of frequent urination.

## 2023-08-06 NOTE — ASSESSMENT
[FreeTextEntry1] : 62 yo M PMH HTN, prediabetes, fatty liver. former smoker (quit 1980s, pack year: 9) presenting today for or HTN follow-up.

## 2023-08-16 ENCOUNTER — APPOINTMENT (OUTPATIENT)
Dept: FAMILY MEDICINE | Facility: CLINIC | Age: 63
End: 2023-08-16
Payer: COMMERCIAL

## 2023-08-16 VITALS
HEIGHT: 71 IN | HEART RATE: 77 BPM | SYSTOLIC BLOOD PRESSURE: 138 MMHG | WEIGHT: 223 LBS | RESPIRATION RATE: 16 BRPM | TEMPERATURE: 98.9 F | DIASTOLIC BLOOD PRESSURE: 80 MMHG | BODY MASS INDEX: 31.22 KG/M2 | OXYGEN SATURATION: 98 %

## 2023-08-16 PROCEDURE — 99213 OFFICE O/P EST LOW 20 MIN: CPT

## 2023-08-16 NOTE — ASSESSMENT
[FreeTextEntry1] : 64 yo M PMH HTN, prediabetes, fatty liver. former smoker (quit 1980s, pack year: 9) presenting today for or HTN follow-up. Bilateral Helical Rim Advancement Flap Text: The defect edges were debeveled with a #15 blade scalpel.  Given the location of the defect and the proximity to free margins (helical rim) a bilateral helical rim advancement flap was deemed most appropriate.  Using a sterile surgical marker, the appropriate advancement flaps were drawn incorporating the defect and placing the expected incisions between the helical rim and antihelix where possible.  The area thus outlined was incised through and through with a #15 scalpel blade.  With a skin hook and iris scissors, the flaps were gently and sharply undermined and freed up.

## 2023-08-16 NOTE — PHYSICAL EXAM
[Normal] : normal rate, regular rhythm, normal S1 and S2 and no murmur heard [de-identified] : no leg swelling Suturegard Retention Suture: 2-0 Nylon

## 2023-08-16 NOTE — PLAN
[FreeTextEntry1] : #HTN  -stable, not optimal  -increase amlodipine to 10 mg QD  -call if any side effects -follow-up in 2 weeks

## 2023-08-16 NOTE — HISTORY OF PRESENT ILLNESS
[FreeTextEntry1] : HTN follow-up   [de-identified] : 64 yo M PMH HTN, prediabetes, fatty liver. former smoker (quit 1980s, pack year: 9) presenting today for or HTN follow-up. On our last visit, we discontinued HCTZ, started amlodipine 5 mg and continued candesartan.  He has kept a BP log at home, on average SBP of 130s, some greater than 135, DBP 80s, a few in the low 90s.   Denies any side effect from amlodipine, has noticed decreased urinary frequency since stopping the HCTZ.

## 2023-08-30 ENCOUNTER — APPOINTMENT (OUTPATIENT)
Dept: FAMILY MEDICINE | Facility: CLINIC | Age: 63
End: 2023-08-30
Payer: COMMERCIAL

## 2023-08-30 VITALS
BODY MASS INDEX: 31.08 KG/M2 | OXYGEN SATURATION: 97 % | RESPIRATION RATE: 16 BRPM | WEIGHT: 222 LBS | SYSTOLIC BLOOD PRESSURE: 120 MMHG | HEART RATE: 75 BPM | DIASTOLIC BLOOD PRESSURE: 76 MMHG | TEMPERATURE: 99.3 F | HEIGHT: 71 IN

## 2023-08-30 PROCEDURE — 99213 OFFICE O/P EST LOW 20 MIN: CPT

## 2023-08-30 NOTE — ASSESSMENT
[FreeTextEntry1] : 64 yo M PMH HTN, prediabetes, fatty liver. former smoker (quit 1980s, pack year: 9) presenting today for or HTN follow-up.

## 2023-08-30 NOTE — HISTORY OF PRESENT ILLNESS
[FreeTextEntry1] : HTN follow-up   [de-identified] : 62 yo M PMH HTN, prediabetes, fatty liver. former smoker (quit 1980s, pack year: 9) presenting today for or HTN follow-up. On our last visit, we increased amlodipine from 5 mg to 10 mg, and continued candesartan.  He has kept a BP log at home, on average SBP 130s/80s, does also have some more elevated numbers.  Denies any side effects from increased dosage of amlodipine.

## 2023-08-30 NOTE — PLAN
[FreeTextEntry1] : #HTN  -elevated on second read (145/90), reports he had a few cups of coffee this morning -continue current regimen of candesartan 32 mg QD and amlodipine 10 mg QD  -he will continue to keep a BP log at home and follow-up if on average > 135/85  - he will call/f/u in office

## 2023-11-15 ENCOUNTER — APPOINTMENT (OUTPATIENT)
Dept: FAMILY MEDICINE | Facility: CLINIC | Age: 63
End: 2023-11-15
Payer: COMMERCIAL

## 2023-11-15 VITALS
TEMPERATURE: 98.6 F | DIASTOLIC BLOOD PRESSURE: 80 MMHG | HEIGHT: 71 IN | OXYGEN SATURATION: 97 % | HEART RATE: 75 BPM | WEIGHT: 213 LBS | SYSTOLIC BLOOD PRESSURE: 130 MMHG | BODY MASS INDEX: 29.82 KG/M2 | RESPIRATION RATE: 14 BRPM

## 2023-11-15 DIAGNOSIS — H90.3 SENSORINEURAL HEARING LOSS, BILATERAL: ICD-10-CM

## 2023-11-15 PROCEDURE — 99214 OFFICE O/P EST MOD 30 MIN: CPT

## 2024-01-26 ENCOUNTER — APPOINTMENT (OUTPATIENT)
Dept: FAMILY MEDICINE | Facility: CLINIC | Age: 64
End: 2024-01-26
Payer: COMMERCIAL

## 2024-01-26 VITALS
HEIGHT: 71 IN | WEIGHT: 217 LBS | BODY MASS INDEX: 30.38 KG/M2 | HEART RATE: 78 BPM | SYSTOLIC BLOOD PRESSURE: 138 MMHG | TEMPERATURE: 97.8 F | OXYGEN SATURATION: 98 % | RESPIRATION RATE: 18 BRPM | DIASTOLIC BLOOD PRESSURE: 82 MMHG

## 2024-01-26 DIAGNOSIS — Z86.69 PERSONAL HISTORY OF OTHER DISEASES OF THE NERVOUS SYSTEM AND SENSE ORGANS: ICD-10-CM

## 2024-01-26 DIAGNOSIS — M25.512 PAIN IN LEFT SHOULDER: ICD-10-CM

## 2024-01-26 DIAGNOSIS — H26.9 UNSPECIFIED CATARACT: ICD-10-CM

## 2024-01-26 PROCEDURE — 99214 OFFICE O/P EST MOD 30 MIN: CPT

## 2024-01-26 RX ORDER — SODIUM PICOSULFATE, MAGNESIUM OXIDE, AND ANHYDROUS CITRIC ACID 10; 3.5; 12 MG/160ML; G/160ML; G/160ML
10-3.5-12 MG-GM LIQUID ORAL
Qty: 1 | Refills: 0 | Status: DISCONTINUED | COMMUNITY
Start: 2022-09-29 | End: 2024-01-26

## 2024-01-26 NOTE — PHYSICAL EXAM
[Normal] : normal rate, regular rhythm, normal S1 and S2 and no murmur heard [No Edema] : there was no peripheral edema [No Extremity Clubbing/Cyanosis] : no extremity clubbing/cyanosis [Normal Gait] : normal gait [Normal Affect] : the affect was normal [Alert and Oriented x3] : oriented to person, place, and time [Normal Insight/Judgement] : insight and judgment were intact

## 2024-01-26 NOTE — HISTORY OF PRESENT ILLNESS
[FreeTextEntry1] : f/u HTN [de-identified] : Pt comes in for BP check. Checks at home, usually 130s/70-80s. Trying to avoid excess salt, admits to having a lot of caffeine (3-4 cups regular or half-caf coffee per day).  Denies chest pain/pressure, headaches, arm/neck/jaw pain. Used to walk a lot, does it less in cold weather.  He was told last year at his yearly ophthalmology visit that he might be developing cataracts.   Also reporting frequent urination. No excessive thirst, no urgency/incontinence, burning, weak stream, nocturia, etc.

## 2024-01-26 NOTE — REVIEW OF SYSTEMS
[Negative] : Cardiovascular [Dysuria] : no dysuria [Incontinence] : no incontinence [Nocturia] : no nocturia [Hematuria] : no hematuria [Frequency] : frequency

## 2024-01-26 NOTE — ASSESSMENT
[FreeTextEntry1] : # HTN Well controlled today Continue current meds - renewed candesartan Continue to avoid excess salt/caffeine in diet. Continue to work on diet/exercise in order to lose weight.  # Early cataracts Referred to ophthalmology - if he does have cataracts wants to see a specialist  # Urinary frequency Possible overactive bladder PSA level decreased in last 2 years. Deferred urology referral for now  f/u for formal CPE

## 2024-02-22 LAB
25(OH)D3 SERPL-MCNC: 28.9 NG/ML
ESTIMATED AVERAGE GLUCOSE: 111 MG/DL
HBA1C MFR BLD HPLC: 5.5 %
TSH SERPL-ACNC: 2.15 UIU/ML

## 2024-05-01 NOTE — PHYSICAL EXAM
[Well Nourished] : well nourished [No Acute Distress] : no acute distress [Well Developed] : well developed [Well-Appearing] : well-appearing [EOMI] : extraocular movements intact [Thyroid Normal, No Nodules] : the thyroid was normal and there were no nodules present [No Respiratory Distress] : no respiratory distress  [No Accessory Muscle Use] : no accessory muscle use [Normal Rate] : normal rate  [Regular Rhythm] : with a regular rhythm [Pedal Pulses Present] : the pedal pulses are present [No Edema] : there was no peripheral edema [No Masses] : no abdominal mass palpated [Normal Posterior Cervical Nodes] : no posterior cervical lymphadenopathy [Normal Anterior Cervical Nodes] : no anterior cervical lymphadenopathy [No CVA Tenderness] : no CVA  tenderness [No Spinal Tenderness] : no spinal tenderness [No Joint Swelling] : no joint swelling [Grossly Normal Strength/Tone] : grossly normal strength/tone [Coordination Grossly Intact] : coordination grossly intact [No Rash] : no rash [Normal Gait] : normal gait [Normal Affect] : the affect was normal [No Focal Deficits] : no focal deficits [Normal Insight/Judgement] : insight and judgment were intact yes

## 2024-05-08 ENCOUNTER — APPOINTMENT (OUTPATIENT)
Dept: FAMILY MEDICINE | Facility: CLINIC | Age: 64
End: 2024-05-08
Payer: COMMERCIAL

## 2024-05-08 VITALS
DIASTOLIC BLOOD PRESSURE: 72 MMHG | OXYGEN SATURATION: 98 % | BODY MASS INDEX: 31.08 KG/M2 | HEART RATE: 81 BPM | HEIGHT: 71 IN | TEMPERATURE: 99.3 F | WEIGHT: 222 LBS | RESPIRATION RATE: 16 BRPM | SYSTOLIC BLOOD PRESSURE: 130 MMHG

## 2024-05-08 DIAGNOSIS — I10 ESSENTIAL (PRIMARY) HYPERTENSION: ICD-10-CM

## 2024-05-08 DIAGNOSIS — Z13.21 ENCOUNTER FOR SCREENING FOR NUTRITIONAL DISORDER: ICD-10-CM

## 2024-05-08 DIAGNOSIS — Z00.00 ENCOUNTER FOR GENERAL ADULT MEDICAL EXAMINATION W/OUT ABNORMAL FINDINGS: ICD-10-CM

## 2024-05-08 DIAGNOSIS — Z12.5 ENCOUNTER FOR SCREENING FOR MALIGNANT NEOPLASM OF PROSTATE: ICD-10-CM

## 2024-05-08 PROCEDURE — 99214 OFFICE O/P EST MOD 30 MIN: CPT

## 2024-05-08 PROCEDURE — G2211 COMPLEX E/M VISIT ADD ON: CPT

## 2024-05-08 RX ORDER — CANDESARTAN CILEXETIL 32 MG/1
32 TABLET ORAL
Qty: 90 | Refills: 1 | Status: ACTIVE | COMMUNITY
Start: 2023-08-02 | End: 1900-01-01

## 2024-05-08 RX ORDER — AMLODIPINE BESYLATE 10 MG/1
10 TABLET ORAL
Qty: 90 | Refills: 1 | Status: ACTIVE | COMMUNITY
Start: 2023-08-02 | End: 1900-01-01

## 2024-05-08 NOTE — PLAN
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

## 2024-05-08 NOTE — HISTORY OF PRESENT ILLNESS
[FreeTextEntry1] : follow-up   [de-identified] : 62 yo M PMH HTN, prediabetes, fatty liver. former smoker (quit 1980s, pack year: 9) presenting today for or HTN follow-up.

## 2024-06-26 ENCOUNTER — APPOINTMENT (OUTPATIENT)
Dept: UROLOGY | Facility: CLINIC | Age: 64
End: 2024-06-26
Payer: COMMERCIAL

## 2024-06-26 VITALS
RESPIRATION RATE: 17 BRPM | SYSTOLIC BLOOD PRESSURE: 139 MMHG | HEIGHT: 71 IN | BODY MASS INDEX: 31.5 KG/M2 | DIASTOLIC BLOOD PRESSURE: 81 MMHG | WEIGHT: 225 LBS | HEART RATE: 82 BPM

## 2024-06-26 DIAGNOSIS — N39.41 URGE INCONTINENCE: ICD-10-CM

## 2024-06-26 DIAGNOSIS — R35.0 FREQUENCY OF MICTURITION: ICD-10-CM

## 2024-06-26 DIAGNOSIS — R39.15 URGENCY OF URINATION: ICD-10-CM

## 2024-06-26 DIAGNOSIS — R33.9 RETENTION OF URINE, UNSPECIFIED: ICD-10-CM

## 2024-06-26 PROCEDURE — 99204 OFFICE O/P NEW MOD 45 MIN: CPT | Mod: 25

## 2024-06-26 PROCEDURE — 51798 US URINE CAPACITY MEASURE: CPT

## 2024-06-26 RX ORDER — ALFUZOSIN HYDROCHLORIDE 10 MG/1
10 TABLET, EXTENDED RELEASE ORAL DAILY
Qty: 30 | Refills: 1 | Status: ACTIVE | COMMUNITY
Start: 2024-06-26 | End: 1900-01-01

## 2024-06-30 LAB
25(OH)D3 SERPL-MCNC: 27.4 NG/ML
ALBUMIN SERPL ELPH-MCNC: 5 G/DL
ALP BLD-CCNC: 60 U/L
ALT SERPL-CCNC: 19 U/L
ANION GAP SERPL CALC-SCNC: 12 MMOL/L
APPEARANCE: CLEAR
AST SERPL-CCNC: 17 U/L
BACTERIA: NEGATIVE /HPF
BASOPHILS # BLD AUTO: 0.06 K/UL
BASOPHILS NFR BLD AUTO: 1.1 %
BILIRUB SERPL-MCNC: 0.5 MG/DL
BILIRUBIN URINE: NEGATIVE
BLOOD URINE: NEGATIVE
BUN SERPL-MCNC: 13 MG/DL
CALCIUM SERPL-MCNC: 10.1 MG/DL
CAST: 0 /LPF
CHLORIDE SERPL-SCNC: 101 MMOL/L
CHOLEST SERPL-MCNC: 181 MG/DL
CO2 SERPL-SCNC: 26 MMOL/L
COLOR: YELLOW
CREAT SERPL-MCNC: 0.92 MG/DL
EGFR: 93 ML/MIN/1.73M2
EOSINOPHIL # BLD AUTO: 0.09 K/UL
EOSINOPHIL NFR BLD AUTO: 1.6 %
EPITHELIAL CELLS: 0 /HPF
GLUCOSE QUALITATIVE U: NEGATIVE MG/DL
GLUCOSE SERPL-MCNC: 120 MG/DL
HCT VFR BLD CALC: 42.8 %
HDLC SERPL-MCNC: 51 MG/DL
HGB BLD-MCNC: 15.3 G/DL
IMM GRANULOCYTES NFR BLD AUTO: 0.4 %
KETONES URINE: NEGATIVE MG/DL
LDLC SERPL CALC-MCNC: 114 MG/DL
LEUKOCYTE ESTERASE URINE: NEGATIVE
LYMPHOCYTES # BLD AUTO: 1.58 K/UL
LYMPHOCYTES NFR BLD AUTO: 27.8 %
MAN DIFF?: NORMAL
MCHC RBC-ENTMCNC: 32.8 PG
MCHC RBC-ENTMCNC: 35.7 GM/DL
MCV RBC AUTO: 91.6 FL
MICROSCOPIC-UA: NORMAL
MONOCYTES # BLD AUTO: 0.43 K/UL
MONOCYTES NFR BLD AUTO: 7.6 %
NEUTROPHILS # BLD AUTO: 3.51 K/UL
NEUTROPHILS NFR BLD AUTO: 61.5 %
NITRITE URINE: NEGATIVE
NONHDLC SERPL-MCNC: 130 MG/DL
PH URINE: 7
PLATELET # BLD AUTO: 180 K/UL
POTASSIUM SERPL-SCNC: 4.3 MMOL/L
PROT SERPL-MCNC: 6.9 G/DL
PROTEIN URINE: NEGATIVE MG/DL
PSA FREE FLD-MCNC: 26 %
PSA FREE SERPL-MCNC: 1 NG/ML
PSA SERPL-MCNC: 3.9 NG/ML
RBC # BLD: 4.67 M/UL
RBC # FLD: 13 %
RED BLOOD CELLS URINE: 0 /HPF
SODIUM SERPL-SCNC: 139 MMOL/L
SPECIFIC GRAVITY URINE: 1.01
TRIGL SERPL-MCNC: 87 MG/DL
UROBILINOGEN URINE: 0.2 MG/DL
WBC # FLD AUTO: 5.69 K/UL
WHITE BLOOD CELLS URINE: 0 /HPF

## 2024-07-10 ENCOUNTER — OUTPATIENT (OUTPATIENT)
Dept: OUTPATIENT SERVICES | Facility: HOSPITAL | Age: 64
LOS: 1 days | End: 2024-07-10
Payer: COMMERCIAL

## 2024-07-10 ENCOUNTER — APPOINTMENT (OUTPATIENT)
Dept: ULTRASOUND IMAGING | Facility: CLINIC | Age: 64
End: 2024-07-10
Payer: COMMERCIAL

## 2024-07-10 DIAGNOSIS — R33.9 RETENTION OF URINE, UNSPECIFIED: ICD-10-CM

## 2024-07-10 PROCEDURE — 76770 US EXAM ABDO BACK WALL COMP: CPT

## 2024-07-10 PROCEDURE — 76770 US EXAM ABDO BACK WALL COMP: CPT | Mod: 26

## 2024-08-01 ENCOUNTER — APPOINTMENT (OUTPATIENT)
Dept: UROLOGY | Facility: CLINIC | Age: 64
End: 2024-08-01
Payer: COMMERCIAL

## 2024-08-01 VITALS
BODY MASS INDEX: 31.5 KG/M2 | WEIGHT: 225 LBS | SYSTOLIC BLOOD PRESSURE: 125 MMHG | RESPIRATION RATE: 17 BRPM | DIASTOLIC BLOOD PRESSURE: 76 MMHG | HEART RATE: 81 BPM | HEIGHT: 71 IN

## 2024-08-01 DIAGNOSIS — R39.12 POOR URINARY STREAM: ICD-10-CM

## 2024-08-01 DIAGNOSIS — R35.0 FREQUENCY OF MICTURITION: ICD-10-CM

## 2024-08-01 DIAGNOSIS — R33.9 RETENTION OF URINE, UNSPECIFIED: ICD-10-CM

## 2024-08-01 DIAGNOSIS — Z80.0 FAMILY HISTORY OF MALIGNANT NEOPLASM OF DIGESTIVE ORGANS: ICD-10-CM

## 2024-08-01 PROCEDURE — 99214 OFFICE O/P EST MOD 30 MIN: CPT | Mod: 25

## 2024-08-01 PROCEDURE — 51798 US URINE CAPACITY MEASURE: CPT

## 2024-08-01 RX ORDER — FINASTERIDE 5 MG/1
5 TABLET, FILM COATED ORAL
Qty: 30 | Refills: 2 | Status: ACTIVE | COMMUNITY
Start: 2024-08-01 | End: 1900-01-01

## 2024-08-01 NOTE — PHYSICAL EXAM
Prostate Size ___ gm] : prostate size [unfilled] gm

## 2024-08-04 PROBLEM — R39.12 WEAK URINARY STREAM: Status: ACTIVE | Noted: 2024-08-04

## 2024-08-04 PROBLEM — R33.9 URINE RETENTION: Status: ACTIVE | Noted: 2024-08-04

## 2024-08-04 NOTE — ADDENDUM
[FreeTextEntry1] : This note was partly authored by Edgar Roland working as a scribe for MARY LOU Doe. I, MARY LOU Doe, have reviewed the content of this note and confirm it is true and accurate. I personally performed the history and physical examination and made all the decisions. 08/01/2024.

## 2024-08-04 NOTE — ASSESSMENT
[FreeTextEntry1] : Last urinated x1.5 hrs ago.  Today  While taking PVR pt denied feeling the need to urinate  Pt expresses strong desire to avoid surgeries if possible, though I explained the risk of upper tract deterioration, hydro, and now retention.  We discussed the use of Alfuzosin to relax the prostate and that we can add another medication, Finasteride, to shrink the prostate.   Patient will start Finasteride 5 MG One Oral tablet daily. I discussed the benefits of Finasteride with the patient such as shrinking benign prostate cells, reducing risk of prostate cancer by 30 percent, hair growth; and potential side of effects such as decreased semen volume, softening of erections, and breast tenderness. I also explained that as this medication shrinks the prostate, it also decreases PSA levels. He expressed understanding.  I explained that he has an enlarged prostate. We discussed the nonspecific nature of the PSA blood test for cancer but that the test is organ specific to the prostate. We discussed that multiple potential reasons can be behind a PSA elevation including prostate cancer, prostate enlargement, or prostate inflammation. Reviewed and discussed PSA levels and explained that from 2016 to 2022 his PSA increased only by 0.6. I informed him that this increase is not remarkable.  When having PSA checked make sure to avoid sex, masturbation, and bike rides as these activities may fluctuate the PSA measurement  PSA in x6 months   Urged that he practice timed voiding as he has urine retention.  Explained that he should drain his bladder with the catheter at least once a day for 3 d and call us with the volumes. We may need to increase frequency and discussed the benefit of "cycling the bladder" but avoiding over distention.  He understood. Supplies were provided and ordered for him.   fu 2 months

## 2024-08-04 NOTE — HISTORY OF PRESENT ILLNESS
[FreeTextEntry1] : 08/01/2024: SHYLA HICKS is a 64 year-old M who presents with follow up for urinary frequency.   At last visit, the pt was started on Alfuzosin.  Before was voiding x9-10's/day. Today reports voiding frequency decreased to about 6-7x's, for which he is happy. Voids more in the morning. As the day goes on it gets better, and he voids less frequently, and "better". Also has less mild loss of urine on the way to restroom. Urine is not soaking through his clothes.  Does not feel any change in strength of his stream, but reports a steady urine flow without pushing or straining.  No FMHx prostate ca.   7/10/24 renal us was fine had a thick bladder wall. which suggests bladder has undergone changes already from "fighting the prostate" and that it may not be elif well to empty.

## 2024-09-26 ENCOUNTER — APPOINTMENT (OUTPATIENT)
Dept: UROLOGY | Facility: CLINIC | Age: 64
End: 2024-09-26

## 2024-11-04 ENCOUNTER — APPOINTMENT (OUTPATIENT)
Dept: FAMILY MEDICINE | Facility: CLINIC | Age: 64
End: 2024-11-04
Payer: COMMERCIAL

## 2024-11-04 VITALS
DIASTOLIC BLOOD PRESSURE: 80 MMHG | TEMPERATURE: 98.3 F | HEIGHT: 71 IN | SYSTOLIC BLOOD PRESSURE: 138 MMHG | HEART RATE: 86 BPM | RESPIRATION RATE: 18 BRPM | BODY MASS INDEX: 33.04 KG/M2 | WEIGHT: 236 LBS | OXYGEN SATURATION: 97 %

## 2024-11-04 DIAGNOSIS — H91.90 UNSPECIFIED HEARING LOSS, UNSPECIFIED EAR: ICD-10-CM

## 2024-11-04 DIAGNOSIS — R73.03 PREDIABETES.: ICD-10-CM

## 2024-11-04 DIAGNOSIS — I10 ESSENTIAL (PRIMARY) HYPERTENSION: ICD-10-CM

## 2024-11-04 DIAGNOSIS — K76.0 FATTY (CHANGE OF) LIVER, NOT ELSEWHERE CLASSIFIED: ICD-10-CM

## 2024-11-04 PROCEDURE — 99214 OFFICE O/P EST MOD 30 MIN: CPT

## 2024-11-25 ENCOUNTER — APPOINTMENT (OUTPATIENT)
Dept: UROLOGY | Facility: CLINIC | Age: 64
End: 2024-11-25

## 2025-03-14 ENCOUNTER — APPOINTMENT (OUTPATIENT)
Dept: UROLOGY | Facility: CLINIC | Age: 65
End: 2025-03-14
Payer: COMMERCIAL

## 2025-03-14 ENCOUNTER — NON-APPOINTMENT (OUTPATIENT)
Age: 65
End: 2025-03-14

## 2025-03-14 VITALS — DIASTOLIC BLOOD PRESSURE: 77 MMHG | HEART RATE: 90 BPM | SYSTOLIC BLOOD PRESSURE: 130 MMHG

## 2025-03-14 DIAGNOSIS — R39.9 UNSPECIFIED SYMPTOMS AND SIGNS INVOLVING THE GENITOURINARY SYSTEM: ICD-10-CM

## 2025-03-14 DIAGNOSIS — N39.41 URGE INCONTINENCE: ICD-10-CM

## 2025-03-14 DIAGNOSIS — R39.12 POOR URINARY STREAM: ICD-10-CM

## 2025-03-14 DIAGNOSIS — R33.9 RETENTION OF URINE, UNSPECIFIED: ICD-10-CM

## 2025-03-14 PROCEDURE — 99214 OFFICE O/P EST MOD 30 MIN: CPT | Mod: 25

## 2025-03-14 PROCEDURE — 51798 US URINE CAPACITY MEASURE: CPT

## 2025-03-15 LAB
BUN SERPL-MCNC: 16 MG/DL
CREAT SERPL-MCNC: 1.1 MG/DL
EGFRCR SERPLBLD CKD-EPI 2021: 75 ML/MIN/1.73M2
ESTIMATED AVERAGE GLUCOSE: 134 MG/DL
HBA1C MFR BLD HPLC: 6.3 %
PSA SERPL-MCNC: 1.48 NG/ML

## 2025-03-21 NOTE — DISCHARGE NOTE PROVIDER - [id number]_CARD_ALL_CORE
----- Message from Pa Bunch MD sent at 3/10/2025  7:53 AM CDT -----  Improved renal function but was still slightly impaired  ----- Message -----  From: Lab, Background User  Sent: 3/7/2025   7:36 PM CDT  To: Pa Bunch MD     No

## 2025-04-21 ENCOUNTER — RX RENEWAL (OUTPATIENT)
Age: 65
End: 2025-04-21

## 2025-07-07 ENCOUNTER — RX RENEWAL (OUTPATIENT)
Age: 65
End: 2025-07-07

## 2025-08-04 ENCOUNTER — APPOINTMENT (OUTPATIENT)
Dept: FAMILY MEDICINE | Facility: CLINIC | Age: 65
End: 2025-08-04
Payer: COMMERCIAL

## 2025-08-04 VITALS
HEIGHT: 71 IN | BODY MASS INDEX: 31.92 KG/M2 | HEART RATE: 89 BPM | TEMPERATURE: 97.6 F | RESPIRATION RATE: 14 BRPM | WEIGHT: 228 LBS | OXYGEN SATURATION: 96 % | SYSTOLIC BLOOD PRESSURE: 120 MMHG | DIASTOLIC BLOOD PRESSURE: 62 MMHG

## 2025-08-04 DIAGNOSIS — R73.03 PREDIABETES.: ICD-10-CM

## 2025-08-04 DIAGNOSIS — I10 ESSENTIAL (PRIMARY) HYPERTENSION: ICD-10-CM

## 2025-08-04 PROCEDURE — 99214 OFFICE O/P EST MOD 30 MIN: CPT

## 2025-08-07 LAB
25(OH)D3 SERPL-MCNC: 24.2 NG/ML
ESTIMATED AVERAGE GLUCOSE: 123 MG/DL
HBA1C MFR BLD HPLC: 5.9 %
TSH SERPL-ACNC: 1.79 UIU/ML

## 2025-08-08 ENCOUNTER — NON-APPOINTMENT (OUTPATIENT)
Age: 65
End: 2025-08-08

## 2025-09-08 ENCOUNTER — APPOINTMENT (OUTPATIENT)
Dept: FAMILY MEDICINE | Facility: CLINIC | Age: 65
End: 2025-09-08
Payer: COMMERCIAL

## 2025-09-08 VITALS
BODY MASS INDEX: 32.76 KG/M2 | RESPIRATION RATE: 14 BRPM | SYSTOLIC BLOOD PRESSURE: 124 MMHG | HEIGHT: 71 IN | WEIGHT: 234 LBS | TEMPERATURE: 97.4 F | HEART RATE: 68 BPM | OXYGEN SATURATION: 97 % | DIASTOLIC BLOOD PRESSURE: 65 MMHG

## 2025-09-08 DIAGNOSIS — Z00.00 ENCOUNTER FOR GENERAL ADULT MEDICAL EXAMINATION W/OUT ABNORMAL FINDINGS: ICD-10-CM

## 2025-09-08 PROCEDURE — 99396 PREV VISIT EST AGE 40-64: CPT
